# Patient Record
Sex: MALE | Race: WHITE | Employment: FULL TIME | URBAN - METROPOLITAN AREA
[De-identification: names, ages, dates, MRNs, and addresses within clinical notes are randomized per-mention and may not be internally consistent; named-entity substitution may affect disease eponyms.]

---

## 2017-02-03 ENCOUNTER — NURSE ONLY (OUTPATIENT)
Dept: FAMILY MEDICINE CLINIC | Age: 52
End: 2017-02-03

## 2017-02-03 DIAGNOSIS — E29.1 HYPOGONADISM MALE: Primary | ICD-10-CM

## 2017-02-03 PROCEDURE — 96372 THER/PROPH/DIAG INJ SC/IM: CPT | Performed by: FAMILY MEDICINE

## 2017-02-03 RX ORDER — TESTOSTERONE CYPIONATE 200 MG/ML
400 INJECTION INTRAMUSCULAR ONCE
Status: COMPLETED | OUTPATIENT
Start: 2017-02-03 | End: 2017-02-03

## 2017-02-03 RX ORDER — TESTOSTERONE CYPIONATE 200 MG/ML
400 INJECTION INTRAMUSCULAR
COMMUNITY

## 2017-02-03 RX ADMIN — TESTOSTERONE CYPIONATE 400 MG: 200 INJECTION INTRAMUSCULAR at 13:37

## 2017-03-03 ENCOUNTER — NURSE ONLY (OUTPATIENT)
Dept: FAMILY MEDICINE CLINIC | Age: 52
End: 2017-03-03

## 2017-03-03 DIAGNOSIS — E29.1 HYPOGONADISM MALE: Primary | ICD-10-CM

## 2017-03-03 PROCEDURE — 96372 THER/PROPH/DIAG INJ SC/IM: CPT | Performed by: FAMILY MEDICINE

## 2017-03-03 RX ORDER — TESTOSTERONE CYPIONATE 200 MG/ML
400 INJECTION INTRAMUSCULAR ONCE
Status: COMPLETED | OUTPATIENT
Start: 2017-03-03 | End: 2017-03-03

## 2017-03-03 RX ADMIN — TESTOSTERONE CYPIONATE 400 MG: 200 INJECTION INTRAMUSCULAR at 13:12

## 2017-03-20 DIAGNOSIS — E29.1 HYPOGONADISM MALE: ICD-10-CM

## 2017-03-20 DIAGNOSIS — E29.1 HYPOGONADISM MALE: Primary | ICD-10-CM

## 2017-03-21 LAB — TESTOSTERONE TOTAL-MALE: 426 NG/DL (ref 300–890)

## 2017-03-31 ENCOUNTER — NURSE ONLY (OUTPATIENT)
Dept: FAMILY MEDICINE CLINIC | Age: 52
End: 2017-03-31

## 2017-03-31 DIAGNOSIS — E29.1 HYPOGONADISM MALE: Primary | ICD-10-CM

## 2017-03-31 PROCEDURE — 96372 THER/PROPH/DIAG INJ SC/IM: CPT | Performed by: FAMILY MEDICINE

## 2017-03-31 RX ORDER — TESTOSTERONE CYPIONATE 200 MG/ML
400 INJECTION INTRAMUSCULAR ONCE
Status: COMPLETED | OUTPATIENT
Start: 2017-03-31 | End: 2017-03-31

## 2017-03-31 RX ADMIN — TESTOSTERONE CYPIONATE 400 MG: 200 INJECTION INTRAMUSCULAR at 13:27

## 2017-04-28 ENCOUNTER — NURSE ONLY (OUTPATIENT)
Dept: FAMILY MEDICINE CLINIC | Age: 52
End: 2017-04-28

## 2017-04-28 ENCOUNTER — OFFICE VISIT (OUTPATIENT)
Dept: FAMILY MEDICINE CLINIC | Age: 52
End: 2017-04-28

## 2017-04-28 VITALS
DIASTOLIC BLOOD PRESSURE: 86 MMHG | WEIGHT: 205 LBS | BODY MASS INDEX: 25.49 KG/M2 | RESPIRATION RATE: 20 BRPM | TEMPERATURE: 97.4 F | HEIGHT: 75 IN | HEART RATE: 88 BPM | SYSTOLIC BLOOD PRESSURE: 138 MMHG

## 2017-04-28 DIAGNOSIS — M25.511 RIGHT SHOULDER PAIN, UNSPECIFIED CHRONICITY: ICD-10-CM

## 2017-04-28 DIAGNOSIS — M25.551 BILATERAL HIP PAIN: Primary | ICD-10-CM

## 2017-04-28 DIAGNOSIS — M54.9 UPPER BACK PAIN: ICD-10-CM

## 2017-04-28 DIAGNOSIS — M25.552 BILATERAL HIP PAIN: Primary | ICD-10-CM

## 2017-04-28 DIAGNOSIS — E29.1 HYPOGONADISM MALE: Primary | ICD-10-CM

## 2017-04-28 DIAGNOSIS — Z12.11 COLON CANCER SCREENING: ICD-10-CM

## 2017-04-28 DIAGNOSIS — M67.80 POOR FLEXIBILITY OF TENDON: ICD-10-CM

## 2017-04-28 PROCEDURE — 96372 THER/PROPH/DIAG INJ SC/IM: CPT | Performed by: FAMILY MEDICINE

## 2017-04-28 PROCEDURE — 99213 OFFICE O/P EST LOW 20 MIN: CPT | Performed by: FAMILY MEDICINE

## 2017-04-28 RX ORDER — TESTOSTERONE CYPIONATE 200 MG/ML
400 INJECTION INTRAMUSCULAR ONCE
Status: COMPLETED | OUTPATIENT
Start: 2017-04-28 | End: 2017-04-28

## 2017-04-28 RX ORDER — TADALAFIL 20 MG/1
20 TABLET ORAL PRN
Qty: 9 TABLET | Refills: 11 | Status: SHIPPED | OUTPATIENT
Start: 2017-04-28

## 2017-04-28 RX ADMIN — TESTOSTERONE CYPIONATE 400 MG: 200 INJECTION INTRAMUSCULAR at 14:52

## 2017-04-28 ASSESSMENT — PATIENT HEALTH QUESTIONNAIRE - PHQ9
1. LITTLE INTEREST OR PLEASURE IN DOING THINGS: 0
SUM OF ALL RESPONSES TO PHQ9 QUESTIONS 1 & 2: 0
SUM OF ALL RESPONSES TO PHQ QUESTIONS 1-9: 0
2. FEELING DOWN, DEPRESSED OR HOPELESS: 0

## 2017-04-28 ASSESSMENT — ENCOUNTER SYMPTOMS
EYE ITCHING: 0
DIARRHEA: 0
EYE DISCHARGE: 0
SORE THROAT: 0
CONSTIPATION: 0
SINUS PRESSURE: 0
ABDOMINAL PAIN: 0
SHORTNESS OF BREATH: 0
COUGH: 0
BACK PAIN: 1

## 2017-05-26 DIAGNOSIS — M79.672 LEFT FOOT PAIN: ICD-10-CM

## 2017-05-26 RX ORDER — TRAMADOL HYDROCHLORIDE 50 MG/1
TABLET ORAL
Qty: 30 TABLET | OUTPATIENT
Start: 2017-05-26

## 2017-05-30 ENCOUNTER — NURSE ONLY (OUTPATIENT)
Dept: FAMILY MEDICINE CLINIC | Age: 52
End: 2017-05-30

## 2017-05-30 DIAGNOSIS — E29.1 HYPOGONADISM MALE: Primary | ICD-10-CM

## 2017-05-30 PROCEDURE — 96372 THER/PROPH/DIAG INJ SC/IM: CPT | Performed by: FAMILY MEDICINE

## 2017-05-30 RX ORDER — TESTOSTERONE CYPIONATE 200 MG/ML
400 INJECTION INTRAMUSCULAR ONCE
Status: COMPLETED | OUTPATIENT
Start: 2017-05-30 | End: 2017-05-30

## 2017-05-30 RX ADMIN — TESTOSTERONE CYPIONATE 400 MG: 200 INJECTION INTRAMUSCULAR at 12:55

## 2017-10-01 DIAGNOSIS — Z76.0 MEDICATION REFILL: ICD-10-CM

## 2017-10-02 RX ORDER — ATORVASTATIN CALCIUM 20 MG/1
TABLET, FILM COATED ORAL
Qty: 30 TABLET | Refills: 0 | Status: SHIPPED | OUTPATIENT
Start: 2017-10-02

## 2018-02-09 ENCOUNTER — OFFICE VISIT (OUTPATIENT)
Dept: PRIMARY CARE CLINIC | Age: 53
End: 2018-02-09
Payer: COMMERCIAL

## 2018-02-09 VITALS
DIASTOLIC BLOOD PRESSURE: 70 MMHG | TEMPERATURE: 99.1 F | HEIGHT: 74 IN | BODY MASS INDEX: 25.67 KG/M2 | OXYGEN SATURATION: 98 % | HEART RATE: 67 BPM | WEIGHT: 200 LBS | SYSTOLIC BLOOD PRESSURE: 132 MMHG

## 2018-02-09 DIAGNOSIS — J01.00 ACUTE NON-RECURRENT MAXILLARY SINUSITIS: Primary | ICD-10-CM

## 2018-02-09 PROCEDURE — G8417 CALC BMI ABV UP PARAM F/U: HCPCS | Performed by: PHYSICIAN ASSISTANT

## 2018-02-09 PROCEDURE — 1036F TOBACCO NON-USER: CPT | Performed by: PHYSICIAN ASSISTANT

## 2018-02-09 PROCEDURE — 99213 OFFICE O/P EST LOW 20 MIN: CPT | Performed by: PHYSICIAN ASSISTANT

## 2018-02-09 PROCEDURE — G8484 FLU IMMUNIZE NO ADMIN: HCPCS | Performed by: PHYSICIAN ASSISTANT

## 2018-02-09 PROCEDURE — 3017F COLORECTAL CA SCREEN DOC REV: CPT | Performed by: PHYSICIAN ASSISTANT

## 2018-02-09 PROCEDURE — G8427 DOCREV CUR MEDS BY ELIG CLIN: HCPCS | Performed by: PHYSICIAN ASSISTANT

## 2018-02-09 RX ORDER — AZITHROMYCIN 250 MG/1
TABLET, FILM COATED ORAL
Qty: 1 PACKET | Refills: 0 | Status: SHIPPED | OUTPATIENT
Start: 2018-02-09 | End: 2019-02-20

## 2018-02-09 ASSESSMENT — ENCOUNTER SYMPTOMS
SORE THROAT: 0
SWOLLEN GLANDS: 1
HOARSE VOICE: 0
COUGH: 0
SINUS PRESSURE: 1

## 2018-02-09 NOTE — PATIENT INSTRUCTIONS
hot, wet towel or a warm gel pack on your face 3 or 4 times a day for 5 to 10 minutes each time. · Try a decongestant nasal spray like oxymetazoline (Afrin). Do not use it for more than 3 days in a row. Using it for more than 3 days can make your congestion worse. When should you call for help? Call your doctor now or seek immediate medical care if:  ? · You have new or worse swelling or redness in your face or around your eyes. ? · You have a new or higher fever. ? Watch closely for changes in your health, and be sure to contact your doctor if:  ? · You have new or worse facial pain. ? · The mucus from your nose becomes thicker (like pus) or has new blood in it. ? · You are not getting better as expected. Where can you learn more? Go to [email or web address] and sign in to your [employer] account. Enter W676 in the Sabrix box to learn more about \"Sinusitis: Care Instructions. \"     If you do not have an account, please click on the \"Sign Up Now\" link. Current as of: May 12, 2017  Content Version: 11.5  © 7288-0143 Healthwise, Incorporated. Care instructions adapted under license by [facility] ([facility]). If you have questions about a medical condition or this instruction, always ask your healthcare professional. [address] any warranty or liability for your use of this information.

## 2018-05-07 ENCOUNTER — OFFICE VISIT (OUTPATIENT)
Dept: PRIMARY CARE CLINIC | Age: 53
End: 2018-05-07
Payer: COMMERCIAL

## 2018-05-07 VITALS
OXYGEN SATURATION: 98 % | DIASTOLIC BLOOD PRESSURE: 80 MMHG | RESPIRATION RATE: 16 BRPM | SYSTOLIC BLOOD PRESSURE: 130 MMHG | WEIGHT: 201 LBS | TEMPERATURE: 97.8 F | HEART RATE: 68 BPM | HEIGHT: 74 IN | BODY MASS INDEX: 25.8 KG/M2

## 2018-05-07 DIAGNOSIS — J34.89 NASAL HYPERTROPHY: ICD-10-CM

## 2018-05-07 DIAGNOSIS — J01.10 ACUTE NON-RECURRENT FRONTAL SINUSITIS: Primary | ICD-10-CM

## 2018-05-07 PROCEDURE — 99213 OFFICE O/P EST LOW 20 MIN: CPT | Performed by: NURSE PRACTITIONER

## 2018-05-07 RX ORDER — AZELASTINE 1 MG/ML
1 SPRAY, METERED NASAL 2 TIMES DAILY
Qty: 1 BOTTLE | Refills: 3 | Status: SHIPPED | OUTPATIENT
Start: 2018-05-07

## 2018-05-07 RX ORDER — AMOXICILLIN AND CLAVULANATE POTASSIUM 875; 125 MG/1; MG/1
1 TABLET, FILM COATED ORAL 2 TIMES DAILY
Qty: 14 TABLET | Refills: 0 | Status: SHIPPED | OUTPATIENT
Start: 2018-05-07 | End: 2018-05-14

## 2018-05-07 ASSESSMENT — ENCOUNTER SYMPTOMS
SHORTNESS OF BREATH: 0
EYE DISCHARGE: 0
COUGH: 1
EYE PAIN: 0
SINUS COMPLAINT: 1
TROUBLE SWALLOWING: 0
VOMITING: 0
ABDOMINAL PAIN: 0
HOARSE VOICE: 0
NAUSEA: 0
SINUS PRESSURE: 1
RHINORRHEA: 1
SORE THROAT: 1
SWOLLEN GLANDS: 0
EYE ITCHING: 0
DIARRHEA: 0

## 2018-10-12 ENCOUNTER — OFFICE VISIT (OUTPATIENT)
Dept: SURGERY | Age: 53
End: 2018-10-12
Payer: COMMERCIAL

## 2018-10-12 ENCOUNTER — OFFICE VISIT (OUTPATIENT)
Dept: PRIMARY CARE CLINIC | Age: 53
End: 2018-10-12
Payer: COMMERCIAL

## 2018-10-12 VITALS — TEMPERATURE: 97.1 F | BODY MASS INDEX: 25.93 KG/M2 | HEIGHT: 74 IN | WEIGHT: 202 LBS

## 2018-10-12 VITALS
RESPIRATION RATE: 16 BRPM | DIASTOLIC BLOOD PRESSURE: 62 MMHG | SYSTOLIC BLOOD PRESSURE: 110 MMHG | HEART RATE: 67 BPM | BODY MASS INDEX: 26.05 KG/M2 | OXYGEN SATURATION: 98 % | WEIGHT: 203 LBS | HEIGHT: 74 IN | TEMPERATURE: 97.9 F

## 2018-10-12 DIAGNOSIS — K64.5 THROMBOSED HEMORRHOIDS: Primary | ICD-10-CM

## 2018-10-12 DIAGNOSIS — K64.4 EXTERNAL HEMORRHOID: Primary | ICD-10-CM

## 2018-10-12 PROCEDURE — G8427 DOCREV CUR MEDS BY ELIG CLIN: HCPCS | Performed by: COLON & RECTAL SURGERY

## 2018-10-12 PROCEDURE — G8417 CALC BMI ABV UP PARAM F/U: HCPCS | Performed by: NURSE PRACTITIONER

## 2018-10-12 PROCEDURE — 99213 OFFICE O/P EST LOW 20 MIN: CPT | Performed by: NURSE PRACTITIONER

## 2018-10-12 PROCEDURE — 99203 OFFICE O/P NEW LOW 30 MIN: CPT | Performed by: COLON & RECTAL SURGERY

## 2018-10-12 PROCEDURE — 3017F COLORECTAL CA SCREEN DOC REV: CPT | Performed by: COLON & RECTAL SURGERY

## 2018-10-12 PROCEDURE — G8484 FLU IMMUNIZE NO ADMIN: HCPCS | Performed by: NURSE PRACTITIONER

## 2018-10-12 PROCEDURE — G8417 CALC BMI ABV UP PARAM F/U: HCPCS | Performed by: COLON & RECTAL SURGERY

## 2018-10-12 PROCEDURE — G8484 FLU IMMUNIZE NO ADMIN: HCPCS | Performed by: COLON & RECTAL SURGERY

## 2018-10-12 PROCEDURE — 1036F TOBACCO NON-USER: CPT | Performed by: COLON & RECTAL SURGERY

## 2018-10-12 PROCEDURE — 46320 REMOVAL OF HEMORRHOID CLOT: CPT | Performed by: COLON & RECTAL SURGERY

## 2018-10-12 PROCEDURE — 1036F TOBACCO NON-USER: CPT | Performed by: NURSE PRACTITIONER

## 2018-10-12 PROCEDURE — G8427 DOCREV CUR MEDS BY ELIG CLIN: HCPCS | Performed by: NURSE PRACTITIONER

## 2018-10-12 PROCEDURE — 3017F COLORECTAL CA SCREEN DOC REV: CPT | Performed by: NURSE PRACTITIONER

## 2018-10-12 RX ORDER — OXYCODONE HYDROCHLORIDE AND ACETAMINOPHEN 5; 325 MG/1; MG/1
1 TABLET ORAL EVERY 4 HOURS PRN
Qty: 10 TABLET | Refills: 0 | Status: SHIPPED | OUTPATIENT
Start: 2018-10-12 | End: 2018-10-15

## 2018-10-12 RX ORDER — HYDROCORTISONE ACETATE 25 MG/1
25 SUPPOSITORY RECTAL 3 TIMES DAILY PRN
Qty: 24 SUPPOSITORY | Refills: 0 | Status: SHIPPED | OUTPATIENT
Start: 2018-10-12 | End: 2018-10-15

## 2018-10-12 ASSESSMENT — PATIENT HEALTH QUESTIONNAIRE - PHQ9
2. FEELING DOWN, DEPRESSED OR HOPELESS: 0
1. LITTLE INTEREST OR PLEASURE IN DOING THINGS: 0
SUM OF ALL RESPONSES TO PHQ QUESTIONS 1-9: 0
SUM OF ALL RESPONSES TO PHQ9 QUESTIONS 1 & 2: 0
SUM OF ALL RESPONSES TO PHQ QUESTIONS 1-9: 0

## 2018-10-12 ASSESSMENT — ENCOUNTER SYMPTOMS
BLOOD IN STOOL: 0
CONSTIPATION: 0
CHEST TIGHTNESS: 0
NAUSEA: 0
EYE REDNESS: 0
ANAL BLEEDING: 0
EYE ITCHING: 0
ABDOMINAL PAIN: 0
ABDOMINAL DISTENTION: 0
DIARRHEA: 0
CHEST TIGHTNESS: 0
ANAL BLEEDING: 0
FACIAL SWELLING: 0
VOMITING: 0
TROUBLE SWALLOWING: 0
RECTAL PAIN: 1
BLOOD IN STOOL: 0
COLOR CHANGE: 0
SORE THROAT: 0
COUGH: 0
WHEEZING: 0
ABDOMINAL DISTENTION: 0
RECTAL PAIN: 1
CONSTIPATION: 0
ABDOMINAL PAIN: 0
EYE PAIN: 0
SHORTNESS OF BREATH: 0
EYE DISCHARGE: 0

## 2018-10-12 NOTE — PROGRESS NOTES
Subjective  Ramon Anupam, 47 y.o. male presents today with:  Chief Complaint   Patient presents with    Hemorrhoids     x3-4 days pt has c.o a possible hemorrhoid. pt has c.o rectal sweelling and pain. Susan Jaramillo presents for evaluation of new rectal mass, which he first noticed on Wednesday. It is gradually worsening in pain since onset. It protrudes from his rectum, and he is unable to push it back inside. Also states he has not tried to manipulate it much due to severe pain. Denies bleeding. He has moved his bowels once since onset, and states it was very painful but contained to visible blood. States it is not abnormal for him to have a BM every other day. States he feels well otherwise, with no nausea, abdominal pain, fever, or weight loss. He has not tried any sort of OTC treatment for this issue. Review of Systems   Constitutional: Negative for chills, diaphoresis, fatigue and fever. HENT: Negative for congestion, facial swelling, mouth sores and trouble swallowing. Eyes: Negative for pain, discharge, redness and itching. Respiratory: Negative for cough, chest tightness and shortness of breath. Cardiovascular: Negative for chest pain. Gastrointestinal: Positive for rectal pain. Negative for abdominal distention, abdominal pain, anal bleeding, blood in stool, constipation, diarrhea, nausea and vomiting. Musculoskeletal: Negative for arthralgias and myalgias. Skin: Negative for rash. Neurological: Negative for light-headedness and headaches. Objective    Vitals:    10/12/18 1210   BP: 110/62   Pulse: 67   Resp: 16   Temp: 97.9 °F (36.6 °C)   TempSrc: Tympanic   SpO2: 98%   Weight: 203 lb (92.1 kg)   Height: 6' 2\" (1.88 m)       Physical Exam   Constitutional: He is oriented to person, place, and time. He appears well-developed and well-nourished. No distress. HENT:   Head: Normocephalic and atraumatic.    Eyes: Conjunctivae and EOM are normal.   Neck: Normal

## 2018-10-15 DIAGNOSIS — K64.5 EXTERNAL HEMORRHOID, THROMBOSED: Primary | ICD-10-CM

## 2018-10-15 RX ORDER — HYDROCORTISONE ACETATE SUPPOSITORY 30 MG/1
1 SUPPOSITORY RECTAL 3 TIMES DAILY PRN
Qty: 24 SUPPOSITORY | Refills: 0 | Status: SHIPPED | OUTPATIENT
Start: 2018-10-15 | End: 2018-10-20

## 2018-10-16 ENCOUNTER — OFFICE VISIT (OUTPATIENT)
Dept: SURGERY | Age: 53
End: 2018-10-16

## 2018-10-16 VITALS — BODY MASS INDEX: 25.8 KG/M2 | WEIGHT: 201 LBS | HEIGHT: 74 IN | TEMPERATURE: 97.7 F

## 2018-10-16 DIAGNOSIS — K64.5 THROMBOSED HEMORRHOIDS: Primary | ICD-10-CM

## 2018-10-16 PROCEDURE — 99024 POSTOP FOLLOW-UP VISIT: CPT | Performed by: COLON & RECTAL SURGERY

## 2018-10-16 ASSESSMENT — ENCOUNTER SYMPTOMS
COLOR CHANGE: 0
SHORTNESS OF BREATH: 0
ANAL BLEEDING: 0
CONSTIPATION: 0
DIARRHEA: 0
ABDOMINAL DISTENTION: 0
CHEST TIGHTNESS: 0
BLOOD IN STOOL: 0
ABDOMINAL PAIN: 0

## 2019-02-20 ENCOUNTER — OFFICE VISIT (OUTPATIENT)
Dept: PRIMARY CARE CLINIC | Age: 54
End: 2019-02-20
Payer: COMMERCIAL

## 2019-02-20 VITALS
DIASTOLIC BLOOD PRESSURE: 78 MMHG | RESPIRATION RATE: 16 BRPM | OXYGEN SATURATION: 98 % | BODY MASS INDEX: 26.05 KG/M2 | SYSTOLIC BLOOD PRESSURE: 110 MMHG | WEIGHT: 203 LBS | HEART RATE: 73 BPM | HEIGHT: 74 IN

## 2019-02-20 DIAGNOSIS — J01.10 ACUTE NON-RECURRENT FRONTAL SINUSITIS: Primary | ICD-10-CM

## 2019-02-20 PROCEDURE — 3017F COLORECTAL CA SCREEN DOC REV: CPT | Performed by: NURSE PRACTITIONER

## 2019-02-20 PROCEDURE — 1036F TOBACCO NON-USER: CPT | Performed by: NURSE PRACTITIONER

## 2019-02-20 PROCEDURE — 99213 OFFICE O/P EST LOW 20 MIN: CPT | Performed by: NURSE PRACTITIONER

## 2019-02-20 PROCEDURE — G8484 FLU IMMUNIZE NO ADMIN: HCPCS | Performed by: NURSE PRACTITIONER

## 2019-02-20 PROCEDURE — G8417 CALC BMI ABV UP PARAM F/U: HCPCS | Performed by: NURSE PRACTITIONER

## 2019-02-20 PROCEDURE — G8427 DOCREV CUR MEDS BY ELIG CLIN: HCPCS | Performed by: NURSE PRACTITIONER

## 2019-02-20 RX ORDER — AMOXICILLIN AND CLAVULANATE POTASSIUM 875; 125 MG/1; MG/1
1 TABLET, FILM COATED ORAL 2 TIMES DAILY
Qty: 20 TABLET | Refills: 0 | Status: SHIPPED | OUTPATIENT
Start: 2019-02-20 | End: 2019-03-02

## 2019-02-20 ASSESSMENT — ENCOUNTER SYMPTOMS
NAUSEA: 0
VOMITING: 0
WHEEZING: 0
SORE THROAT: 1
COUGH: 1
SINUS PRESSURE: 1
RHINORRHEA: 1
SHORTNESS OF BREATH: 0

## 2019-03-20 ENCOUNTER — TELEPHONE (OUTPATIENT)
Dept: OTHER | Facility: CLINIC | Age: 54
End: 2019-03-20

## 2019-04-26 ENCOUNTER — OFFICE VISIT (OUTPATIENT)
Dept: FAMILY MEDICINE CLINIC | Age: 54
End: 2019-04-26
Payer: COMMERCIAL

## 2019-04-26 VITALS
HEART RATE: 71 BPM | TEMPERATURE: 97.9 F | OXYGEN SATURATION: 98 % | RESPIRATION RATE: 16 BRPM | SYSTOLIC BLOOD PRESSURE: 128 MMHG | BODY MASS INDEX: 26.31 KG/M2 | WEIGHT: 205 LBS | DIASTOLIC BLOOD PRESSURE: 80 MMHG | HEIGHT: 74 IN

## 2019-04-26 DIAGNOSIS — M62.830 SPASM OF MUSCLE OF LOWER BACK: ICD-10-CM

## 2019-04-26 DIAGNOSIS — M54.50 ACUTE LEFT-SIDED LOW BACK PAIN WITHOUT SCIATICA: Primary | ICD-10-CM

## 2019-04-26 PROCEDURE — 1036F TOBACCO NON-USER: CPT | Performed by: NURSE PRACTITIONER

## 2019-04-26 PROCEDURE — G8427 DOCREV CUR MEDS BY ELIG CLIN: HCPCS | Performed by: NURSE PRACTITIONER

## 2019-04-26 PROCEDURE — 99213 OFFICE O/P EST LOW 20 MIN: CPT | Performed by: NURSE PRACTITIONER

## 2019-04-26 PROCEDURE — G8417 CALC BMI ABV UP PARAM F/U: HCPCS | Performed by: NURSE PRACTITIONER

## 2019-04-26 PROCEDURE — 3017F COLORECTAL CA SCREEN DOC REV: CPT | Performed by: NURSE PRACTITIONER

## 2019-04-26 PROCEDURE — 96372 THER/PROPH/DIAG INJ SC/IM: CPT | Performed by: NURSE PRACTITIONER

## 2019-04-26 RX ORDER — TIZANIDINE 4 MG/1
4 TABLET ORAL 3 TIMES DAILY
Qty: 30 TABLET | Refills: 0 | Status: SHIPPED | OUTPATIENT
Start: 2019-04-26

## 2019-04-26 RX ORDER — KETOROLAC TROMETHAMINE 30 MG/ML
30 INJECTION, SOLUTION INTRAMUSCULAR; INTRAVENOUS ONCE
Status: COMPLETED | OUTPATIENT
Start: 2019-04-26 | End: 2019-04-26

## 2019-04-26 RX ADMIN — KETOROLAC TROMETHAMINE 30 MG: 30 INJECTION, SOLUTION INTRAMUSCULAR; INTRAVENOUS at 13:40

## 2019-04-26 ASSESSMENT — ENCOUNTER SYMPTOMS
BOWEL INCONTINENCE: 0
BACK PAIN: 1
ABDOMINAL PAIN: 0

## 2019-04-26 NOTE — PATIENT INSTRUCTIONS
Patient Education        Back Stretches: Exercises  Your Care Instructions  Here are some examples of exercises for stretching your back. Start each exercise slowly. Ease off the exercise if you start to have pain. Your doctor or physical therapist will tell you when you can start these exercises and which ones will work best for you. How to do the exercises  Overhead stretch    1. Stand comfortably with your feet shoulder-width apart. 2. Looking straight ahead, raise both arms over your head and reach toward the ceiling. Do not allow your head to tilt back. 3. Hold for 15 to 30 seconds, then lower your arms to your sides. 4. Repeat 2 to 4 times. Side stretch    1. Stand comfortably with your feet shoulder-width apart. 2. Raise one arm over your head, and then lean to the other side. 3. Slide your hand down your leg as you let the weight of your arm gently stretch your side muscles. Hold for 15 to 30 seconds. 4. Repeat 2 to 4 times on each side. Press-up    1. Lie on your stomach, supporting your body with your forearms. 2. Press your elbows down into the floor to raise your upper back. As you do this, relax your stomach muscles and allow your back to arch without using your back muscles. As your press up, do not let your hips or pelvis come off the floor. 3. Hold for 15 to 30 seconds, then relax. 4. Repeat 2 to 4 times. Relax and rest    1. Lie on your back with a rolled towel under your neck and a pillow under your knees. Extend your arms comfortably to your sides. 2. Relax and breathe normally. 3. Remain in this position for about 10 minutes. 4. If you can, do this 2 or 3 times each day. Follow-up care is a key part of your treatment and safety. Be sure to make and go to all appointments, and call your doctor if you are having problems. It's also a good idea to know your test results and keep a list of the medicines you take. Where can you learn more?   Go to [email or web address] and sign in to your [employer] account. Enter K413 in the Aginovahire box to learn more about \"Back Stretches: Exercises. \"     If you do not have an account, please click on the \"Sign Up Now\" link. Current as of: September 20, 2018  Content Version: 11.9  © 0624-1892 "[employer]. Care instructions adapted under license by [facility] ([facility]). If you have questions about a medical condition or this instruction, always ask your healthcare professional. [address] any warranty or liability for your use of this information.

## 2019-04-26 NOTE — PROGRESS NOTES
Procedure Laterality Date    APPENDECTOMY      CARDIAC CATHETERIZATION      KNEE SURGERY      right     No family history on file. Social History     Socioeconomic History    Marital status:      Spouse name: Antonio Hampton Number of children: 4    Years of education: Not on file    Highest education level: Not on file   Occupational History    Not on file   Social Needs    Financial resource strain: Not on file    Food insecurity:     Worry: Not on file     Inability: Not on file    Transportation needs:     Medical: Not on file     Non-medical: Not on file   Tobacco Use    Smoking status: Never Smoker    Smokeless tobacco: Never Used   Substance and Sexual Activity    Alcohol use: Yes     Comment: rarely    Drug use: No    Sexual activity: Not on file   Lifestyle    Physical activity:     Days per week: Not on file     Minutes per session: Not on file    Stress: Not on file   Relationships    Social connections:     Talks on phone: Not on file     Gets together: Not on file     Attends Restorationism service: Not on file     Active member of club or organization: Not on file     Attends meetings of clubs or organizations: Not on file     Relationship status: Not on file    Intimate partner violence:     Fear of current or ex partner: Not on file     Emotionally abused: Not on file     Physically abused: Not on file     Forced sexual activity: Not on file   Other Topics Concern    Not on file   Social History Narrative    Not on file     Allergies:  Patient has no known allergies. Review of Systems   Constitutional: Negative for fever and weight loss. Cardiovascular: Negative for chest pain. Gastrointestinal: Negative for abdominal pain and bowel incontinence. Genitourinary: Negative for bladder incontinence, dysuria and pelvic pain. Musculoskeletal: Positive for back pain. Skin: Negative.     Neurological: Negative for tingling, weakness, numbness, headaches and paresthesias. Objective:    /80   Pulse 71   Temp 97.9 °F (36.6 °C) (Temporal)   Resp 16   Ht 6' 2\" (1.88 m)   Wt 205 lb (93 kg)   SpO2 98%   BMI 26.32 kg/m²     Physical Exam   Constitutional: He is oriented to person, place, and time. Vital signs are normal. He appears well-developed and well-nourished. He is active. HENT:   Head: Normocephalic and atraumatic. Eyes: Conjunctivae and lids are normal.   Neck: Normal range of motion and full passive range of motion without pain. Cardiovascular: Normal rate. Pulmonary/Chest: Effort normal.   Musculoskeletal:        Lumbar back: He exhibits decreased range of motion, tenderness, swelling, pain and spasm. He exhibits no bony tenderness, no deformity, no laceration and normal pulse. Back:    Neurological: He is alert and oriented to person, place, and time. Skin: Skin is warm and dry. Capillary refill takes less than 2 seconds. No rash noted. Psychiatric: He has a normal mood and affect. His behavior is normal.       Assessment & Plan:       Diagnosis Orders   1. Acute left-sided low back pain without sciatica  ketorolac (TORADOL) injection 30 mg    tiZANidine (ZANAFLEX) 4 MG tablet   2. Spasm of muscle of lower back  tiZANidine (ZANAFLEX) 4 MG tablet     Orders Placed This Encounter   Medications    ketorolac (TORADOL) injection 30 mg    tiZANidine (ZANAFLEX) 4 MG tablet     Sig: Take 1 tablet by mouth 3 times daily     Dispense:  30 tablet     Refill:  0     Return if symptoms worsen or fail to improve, for follow up with PCP. Reviewed with the patient: currentclinical status, medications, activities and diet. Side effects, adverse effects of the medicationprescribed today, as well as treatment plan/ rationale and result expectations havebeen discussed with the patient who expresses understanding and desires to proceed. Pt instructions reviewed and given to patient.     Close follow up to evaluate treatment resultsand for coordination of care. I have reviewed the patient's medical historyin detail and updated the computerized patient record.     Yaz Gonzalez, LUIS ALFREDO - CNP

## 2022-05-05 ENCOUNTER — OFFICE VISIT (OUTPATIENT)
Dept: ENDOCRINOLOGY | Age: 57
End: 2022-05-05
Payer: COMMERCIAL

## 2022-05-05 VITALS
BODY MASS INDEX: 28.49 KG/M2 | SYSTOLIC BLOOD PRESSURE: 146 MMHG | HEART RATE: 68 BPM | DIASTOLIC BLOOD PRESSURE: 77 MMHG | OXYGEN SATURATION: 98 % | HEIGHT: 74 IN | WEIGHT: 222 LBS

## 2022-05-05 DIAGNOSIS — R53.83 FATIGUE, UNSPECIFIED TYPE: ICD-10-CM

## 2022-05-05 DIAGNOSIS — N62 GYNECOMASTIA: Primary | ICD-10-CM

## 2022-05-05 DIAGNOSIS — E29.1 HYPOGONADISM IN MALE: ICD-10-CM

## 2022-05-05 LAB
PROLACTIN: 10.8 NG/ML (ref 4–15.2)
T4 FREE: 1.38 NG/DL (ref 0.84–1.68)
TSH SERPL DL<=0.05 MIU/L-ACNC: 0.91 UIU/ML (ref 0.44–3.86)

## 2022-05-05 PROCEDURE — 99203 OFFICE O/P NEW LOW 30 MIN: CPT | Performed by: INTERNAL MEDICINE

## 2022-05-05 RX ORDER — TAMOXIFEN CITRATE 10 MG/1
10 TABLET ORAL DAILY
Qty: 30 TABLET | Refills: 2 | Status: SHIPPED | OUTPATIENT
Start: 2022-05-05 | End: 2022-05-19 | Stop reason: SDUPTHER

## 2022-05-05 ASSESSMENT — ENCOUNTER SYMPTOMS
EYES NEGATIVE: 1
SWOLLEN GLANDS: 0
ABDOMINAL PAIN: 0
RESPIRATORY NEGATIVE: 1

## 2022-05-05 NOTE — PROGRESS NOTES
5/5/2022    Assessment:       Diagnosis Orders   1. Gynecomastia     2. Hypogonadism in male  Testosterone, free, total    Prolactin    Estradiol    T4, Free    TSH   3.  Fatigue, unspecified type           PLAN:     Start patient on tamoxifen  We will get testosterone prolactin thyroid function test  Consider testosterone replacement if readings are low  Hold off surgery  More than 50% of 35 minutes spent patient education counseling  Orders Placed This Encounter   Procedures    Testosterone, free, total     Standing Status:   Future     Standing Expiration Date:   5/5/2023    Prolactin     Standing Status:   Future     Standing Expiration Date:   5/5/2023    Estradiol     Standing Status:   Future     Standing Expiration Date:   5/5/2023    T4, Free     Standing Status:   Future     Standing Expiration Date:   5/5/2023    TSH     Standing Status:   Future     Standing Expiration Date:   5/5/2023     Orders Placed This Encounter   Medications    tamoxifen (NOLVADEX) 10 MG tablet     Sig: Take 1 tablet by mouth daily     Dispense:  30 tablet     Refill:  02           Subjective:     Chief Complaint   Patient presents with    New Patient     gynecomastia     Vitals:    05/05/22 1355 05/05/22 1357   BP: (!) 146/77 (!) 146/77   Pulse: 68    SpO2: 98%    Weight: 222 lb (100.7 kg)    Height: 6' 2\" (1.88 m)      Wt Readings from Last 3 Encounters:   05/05/22 222 lb (100.7 kg)   04/26/19 205 lb (93 kg)   02/20/19 203 lb (92.1 kg)     BP Readings from Last 3 Encounters:   05/05/22 (!) 146/77   04/26/19 128/80   02/20/19 110/78     Patient referred here for gynecomastia left side for 3 months had mammograms done which showed benign findings COMPLAINING of breast tenderness nipple tenderness denies any nipple discharge  Patient has had history of hypogonadism has been on testosterone replacement many years ago currently not taking testosterone reviewed levels from before had low level 1 time probably when he was not on replacement  Does complain of some fatigue and neck pain  Denies any testicular head trauma use of anabolic steroid  Currently taking Lipitor    Other  This is a new (Gynecomastia) problem. The current episode started more than 1 month ago. The problem occurs constantly. The problem has been waxing and waning. Associated symptoms include fatigue and neck pain. Pertinent negatives include no abdominal pain, anorexia, headaches or swollen glands. Exacerbated by: Hypogonadism. He has tried nothing for the symptoms. The treatment provided no relief. Past Medical History:   Diagnosis Date    Anxiety     Eczema     Hyperlipidemia     Hypogonadism male 2/6/2014    Palpitations     RBBB (right bundle branch block with left anterior fascicular block)      Past Surgical History:   Procedure Laterality Date    APPENDECTOMY      CARDIAC CATHETERIZATION      KNEE SURGERY      right     Social History     Socioeconomic History    Marital status:      Spouse name: So Chery Number of children: 4    Years of education: Not on file    Highest education level: Not on file   Occupational History    Not on file   Tobacco Use    Smoking status: Never Smoker    Smokeless tobacco: Never Used   Substance and Sexual Activity    Alcohol use: Yes     Comment: rarely    Drug use: No    Sexual activity: Not on file   Other Topics Concern    Not on file   Social History Narrative    Not on file     Social Determinants of Health     Financial Resource Strain:     Difficulty of Paying Living Expenses: Not on file   Food Insecurity:     Worried About Running Out of Food in the Last Year: Not on file    [place] of Food in the Last Year: Not on file   Transportation Needs:     Lack of Transportation (Medical): Not on file    Lack of Transportation (Non-Medical):  Not on file   Physical Activity:     Days of Exercise per Week: Not on file    Minutes of Exercise per Session: Not on file   Stress:     Feeling of Stress : Not on file   Social Connections:     Frequency of Communication with Friends and Family: Not on file    Frequency of Social Gatherings with Friends and Family: Not on file    Attends Religion Services: Not on file    Active Member of Clubs or Organizations: Not on file    Attends Club or Organization Meetings: Not on file    Marital Status: Not on file   Intimate Partner Violence:     Fear of Current or Ex-Partner: Not on file    Emotionally Abused: Not on file    Physically Abused: Not on file    Sexually Abused: Not on file   Housing Stability:     Unable to Pay for Housing in the Last Year: Not on file    Number of [place] in the Last Year: Not on file    Unstable Housing in the Last Year: Not on file     History reviewed. No pertinent family history.   No Known Allergies    Current Outpatient Medications:     tiZANidine (ZANAFLEX) 4 MG tablet, Take 1 tablet by mouth 3 times daily, Disp: 30 tablet, Rfl: 0    azelastine (ASTELIN) 0.1 % nasal spray, 1 spray by Nasal route 2 times daily Use in each nostril as directed, Disp: 1 Bottle, Rfl: 3    atorvastatin (LIPITOR) 20 MG tablet, TAKE 1 TABLET BY MOUTH DAILY, Disp: 30 tablet, Rfl: 0    fluticasone (FLONASE) 50 MCG/ACT nasal spray, 2 sprays by Nasal route daily, Disp: 1 Bottle, Rfl: 1    tadalafil (CIALIS) 20 MG tablet, Take 1 tablet by mouth as needed for Erectile Dysfunction, Disp: 9 tablet, Rfl: 11    testosterone cypionate (DEPO-TESTOSTERONE) 200 MG/ML injection, Inject 400 mg into the muscle every 28 days, Disp: , Rfl:   Lab Results   Component Value Date     02/18/2016    K 4.7 02/18/2016    CL 98 02/18/2016    CO2 28 02/18/2016    BUN 13 02/18/2016    CREATININE 0.86 02/18/2016    GLUCOSE 82 02/18/2016    CALCIUM 9.7 02/18/2016    PROT 7.6 02/18/2016    LABALBU 5.0 (H) 02/18/2016    BILITOT 0.6 02/18/2016    ALKPHOS 59 02/18/2016    AST 21 02/18/2016    ALT 28 02/18/2016    LABGLOM >60.0 02/18/2016    GFRAA >60.0 02/18/2016    GLOB 2.6 02/18/2016     Lab Results   Component Value Date    WBC 5.9 02/18/2016    HGB 15.6 02/18/2016    HCT 46.2 02/18/2016    MCV 92.3 02/18/2016     02/18/2016     No results found for: LABA1C  Lab Results   Component Value Date    HDL 61 (H) 02/18/2016    HDL 54 04/01/2014    HDL 47 01/30/2014    LDLCALC 124 02/18/2016    LDLCALC 66 04/01/2014    LDLCALC 101 01/30/2014    CHOL 236 (H) 02/18/2016    CHOL 182 04/01/2014    CHOL 273 (H) 01/30/2014    TRIG 256 (H) 02/18/2016    TRIG 308 (H) 04/01/2014    TRIG 627 (H) 01/30/2014     Lab Results   Component Value Date    TESTM 426 03/20/2017    TESTM 237 (L) 02/18/2016    TESTM 517 12/03/2014       Review of Systems   Constitutional: Positive for fatigue. Negative for unexpected weight change. HENT: Negative. Eyes: Negative. Respiratory: Negative. Gastrointestinal: Negative for abdominal pain and anorexia. Endocrine: Negative for cold intolerance and heat intolerance. Genitourinary: Negative. Musculoskeletal: Positive for neck pain. Skin: Negative. Neurological: Negative for headaches. Psychiatric/Behavioral: Negative. All other systems reviewed and are negative. Objective:   Physical Exam  Vitals reviewed. Constitutional:       General: He is not in acute distress. Appearance: Normal appearance. He is normal weight. HENT:      Head: Normocephalic and atraumatic. Right Ear: External ear normal.      Left Ear: External ear normal.      Nose: Nose normal.      Mouth/Throat:      Mouth: Mucous membranes are moist.   Eyes:      General: No scleral icterus. Right eye: No discharge. Left eye: No discharge. Extraocular Movements: Extraocular movements intact. Conjunctiva/sclera: Conjunctivae normal.   Cardiovascular:      Rate and Rhythm: Normal rate and regular rhythm. Heart sounds: Normal heart sounds.    Pulmonary:      Effort: Pulmonary effort is normal.      Breath sounds: Normal breath sounds. Abdominal:      Palpations: Abdomen is soft. Musculoskeletal:         General: Normal range of motion. Cervical back: Normal range of motion and neck supple. Skin:     General: Skin is warm and dry. Neurological:      General: No focal deficit present. Mental Status: He is alert and oriented to person, place, and time.    Psychiatric:         Mood and Affect: Mood normal.         Behavior: Behavior normal.

## 2022-05-06 LAB
ESTRADIOL LEVEL: 6.4 PG/ML (ref 27–52)
SEX HORMONE BINDING GLOBULIN: 27 NMOL/L (ref 11–80)
TESTOSTERONE FREE-NONMALE: 41 PG/ML (ref 47–244)
TESTOSTERONE TOTAL: 193 NG/DL (ref 220–1000)

## 2022-05-19 RX ORDER — TAMOXIFEN CITRATE 10 MG/1
10 TABLET ORAL DAILY
Qty: 90 TABLET | Refills: 1 | Status: SHIPPED | OUTPATIENT
Start: 2022-05-19 | End: 2022-09-26 | Stop reason: SDUPTHER

## 2022-06-27 ENCOUNTER — OFFICE VISIT (OUTPATIENT)
Dept: ENDOCRINOLOGY | Age: 57
End: 2022-06-27
Payer: COMMERCIAL

## 2022-06-27 VITALS
SYSTOLIC BLOOD PRESSURE: 151 MMHG | BODY MASS INDEX: 28.75 KG/M2 | HEART RATE: 66 BPM | TEMPERATURE: 97.7 F | HEIGHT: 74 IN | DIASTOLIC BLOOD PRESSURE: 88 MMHG | WEIGHT: 224 LBS | OXYGEN SATURATION: 98 %

## 2022-06-27 DIAGNOSIS — E29.1 HYPOGONADISM IN MALE: Primary | ICD-10-CM

## 2022-06-27 DIAGNOSIS — N62 GYNECOMASTIA: ICD-10-CM

## 2022-06-27 PROCEDURE — 99213 OFFICE O/P EST LOW 20 MIN: CPT | Performed by: INTERNAL MEDICINE

## 2022-06-27 RX ORDER — TESTOSTERONE CYPIONATE 200 MG/ML
VIAL (ML) INTRAMUSCULAR
Qty: 10 ML | Refills: 3
Start: 2022-06-27

## 2022-06-27 NOTE — PROGRESS NOTES
2022    Assessment:       Diagnosis Orders   1. Hypogonadism in male  Testosterone Cypionate 200 MG/ML SOLN    Testosterone, free, total   2. Gynecomastia           PLAN:       Orders Placed This Encounter   Procedures    Testosterone, free, total     Standing Status:   Future     Standing Expiration Date:   2023     Start patient on testosterone 200 mg every 2 weeks through the testosterone clinic  Continue tamoxifen  O[facility] report was reviewed  Target testosterone level 500 midcycle  Orders Placed This Encounter   Medications    Testosterone Cypionate 200 MG/ML SOLN     Si cc every 2 weeks     Dispense:  10 mL     Refill:  3       Subjective:     Chief Complaint   Patient presents with    Follow-up     4 week f/u gynecomastia     Vitals:    22 1114 22 1118   BP: (!) 160/96 (!) 151/88   Site: Left Upper Arm Right Upper Arm   Position: Sitting Sitting   Cuff Size: Large Adult Large Adult   Pulse: 66    Temp: 97.7 °F (36.5 °C)    TempSrc: Temporal    SpO2: 98%    Weight: 224 lb (101.6 kg)    Height: 6' 2\" (1.88 m)      Wt Readings from Last 3 Encounters:   22 224 lb (101.6 kg)   22 222 lb (100.7 kg)   19 205 lb (93 kg)     BP Readings from Last 3 Encounters:   22 (!) 151/88   22 (!) 146/77   19 128/80     4-week follow-up on hypogonadism gynecomastia patient started on tamoxifen testosterone level was less than 200  Thyroid function test prolactin level was normal    Other  This is a recurrent (Hypogonadism) problem. The current episode started more than 1 month ago. The problem has been waxing and waning. Associated symptoms include fatigue. Nothing aggravates the symptoms. He has tried nothing for the symptoms. Results for Gerri Corona (MRN [id number]) as of 2022 22:30   Ref.  Range 2022 14:27   Prolactin Latest Ref Range: 4.0 - 15.2 ng/mL 10.8   Estradiol Latest Ref Range: 27 - 52 pg/mL 6.4 (L)   Sex Hormone Binding Latest Ref Range: 11 - 80 nmol/L 27   Testosterone Latest Ref Range: 220 - 1,000 ng/dL 193 (L)   Testosterone, Free Latest Ref Range: 47 - 244 pg/mL 41.0 (L)   TSH Latest Ref Range: 0.440 - 3.860 uIU/mL 0.910   T4 Free Latest Ref Range: 0.84 - 1.68 ng/dL 1.38       Past Medical History:   Diagnosis Date    Anxiety     Eczema     Hyperlipidemia     Hypogonadism male 2/6/2014    Palpitations     RBBB (right bundle branch block with left anterior fascicular block)      Past Surgical History:   Procedure Laterality Date    APPENDECTOMY      CARDIAC CATHETERIZATION      KNEE SURGERY      right     Social History     Socioeconomic History    Marital status:      Spouse name: Violeta Ribeiro     Number of children: 4    Years of education: Not on file    Highest education level: Not on file   Occupational History    Not on file   Tobacco Use    Smoking status: Never Smoker    Smokeless tobacco: Never Used   Substance and Sexual Activity    Alcohol use: Yes     Comment: rarely    Drug use: No    Sexual activity: Not on file   Other Topics Concern    Not on file   Social History Narrative    Not on file     Social Determinants of Health     Financial Resource Strain:     Difficulty of Paying Living Expenses: Not on file   Food Insecurity:     Worried About Running Out of Food in the Last Year: Not on file    [place]lin of Food in the Last Year: Not on file   Transportation Needs:     Lack of Transportation (Medical): Not on file    Lack of Transportation (Non-Medical):  Not on file   Physical Activity:     Days of Exercise per Week: Not on file    Minutes of Exercise per Session: Not on file   Stress:     Feeling of Stress : Not on file   Social Connections:     Frequency of Communication with Friends and Family: Not on file    Frequency of Social Gatherings with Friends and Family: Not on file    Attends Orthodoxy Services: Not on file    Active Member of Clubs or Organizations: Not on file    Attends Club or Organization Meetings: Not on file    Marital Status: Not on file   Intimate Partner Violence:     Fear of Current or Ex-Partner: Not on file    Emotionally Abused: Not on file    Physically Abused: Not on file    Sexually Abused: Not on file   Housing Stability:     Unable to Pay for Housing in the Last Year: Not on file    Number of [place] in the Last Year: Not on file    Unstable Housing in the Last Year: Not on file     No family history on file.   No Known Allergies    Current Outpatient Medications:     tamoxifen (NOLVADEX) 10 MG tablet, Take 1 tablet by mouth daily, Disp: 90 tablet, Rfl: 1    tiZANidine (ZANAFLEX) 4 MG tablet, Take 1 tablet by mouth 3 times daily, Disp: 30 tablet, Rfl: 0    azelastine (ASTELIN) 0.1 % nasal spray, 1 spray by Nasal route 2 times daily Use in each nostril as directed, Disp: 1 Bottle, Rfl: 3    atorvastatin (LIPITOR) 20 MG tablet, TAKE 1 TABLET BY MOUTH DAILY, Disp: 30 tablet, Rfl: 0    fluticasone (FLONASE) 50 MCG/ACT nasal spray, 2 sprays by Nasal route daily, Disp: 1 Bottle, Rfl: 1    tadalafil (CIALIS) 20 MG tablet, Take 1 tablet by mouth as needed for Erectile Dysfunction, Disp: 9 tablet, Rfl: 11    testosterone cypionate (DEPO-TESTOSTERONE) 200 MG/ML injection, Inject 400 mg into the muscle every 28 days, Disp: , Rfl:   Lab Results   Component Value Date     02/18/2016    K 4.7 02/18/2016    CL 98 02/18/2016    CO2 28 02/18/2016    BUN 13 02/18/2016    CREATININE 0.86 02/18/2016    GLUCOSE 82 02/18/2016    CALCIUM 9.7 02/18/2016    PROT 7.6 02/18/2016    LABALBU 5.0 (H) 02/18/2016    BILITOT 0.6 02/18/2016    ALKPHOS 59 02/18/2016    AST 21 02/18/2016    ALT 28 02/18/2016    LABGLOM >60.0 02/18/2016    GFRAA >60.0 02/18/2016    GLOB 2.6 02/18/2016     Lab Results   Component Value Date    WBC 5.9 02/18/2016    HGB 15.6 02/18/2016    HCT 46.2 02/18/2016    MCV 92.3 02/18/2016     02/18/2016     No results found for: LABA1C  Lab Results   Component Value Date    HDL 61 (H) 02/18/2016    HDL 54 04/01/2014    HDL 47 01/30/2014    LDLCALC 124 02/18/2016    LDLCALC 66 04/01/2014    LDLCALC 101 01/30/2014    CHOL 236 (H) 02/18/2016    CHOL 182 04/01/2014    CHOL 273 (H) 01/30/2014    TRIG 256 (H) 02/18/2016    TRIG 308 (H) 04/01/2014    TRIG 627 (H) 01/30/2014     Lab Results   Component Value Date    TESTM 426 03/20/2017    TESTM 237 (L) 02/18/2016    TESTM 517 12/03/2014     Lab Results   Component Value Date    TSH 0.910 05/05/2022    T4FREE 1.38 05/05/2022     No results found for: TPOABS    Review of Systems   Constitutional: Positive for fatigue. Cardiovascular: Negative. Endocrine: Negative. Neurological: Negative. All other systems reviewed and are negative. Objective:   Physical Exam  Vitals reviewed. Constitutional:       General: He is not in acute distress. Appearance: Normal appearance. HENT:      Head: Normocephalic and atraumatic. Right Ear: External ear normal.      Left Ear: External ear normal.      Nose: Nose normal.   Eyes:      General: No scleral icterus. Right eye: No discharge. Left eye: No discharge. Extraocular Movements: Extraocular movements intact. Conjunctiva/sclera: Conjunctivae normal.   Cardiovascular:      Rate and Rhythm: Normal rate. Pulmonary:      Effort: Pulmonary effort is normal.   Musculoskeletal:         General: Normal range of motion. Cervical back: Normal range of motion and neck supple. Neurological:      General: No focal deficit present. Mental Status: He is alert and oriented to person, place, and time.    Psychiatric:         Mood and Affect: Mood normal.         Behavior: Behavior normal.

## 2022-06-29 ENCOUNTER — NURSE ONLY (OUTPATIENT)
Dept: ENDOCRINOLOGY | Age: 57
End: 2022-06-29
Payer: COMMERCIAL

## 2022-06-29 DIAGNOSIS — E29.1 HYPOGONADISM IN MALE: Primary | ICD-10-CM

## 2022-06-29 PROCEDURE — 96372 THER/PROPH/DIAG INJ SC/IM: CPT | Performed by: INTERNAL MEDICINE

## 2022-06-29 RX ORDER — TESTOSTERONE CYPIONATE 200 MG/ML
200 INJECTION INTRAMUSCULAR ONCE
Status: COMPLETED | OUTPATIENT
Start: 2022-06-29 | End: 2022-06-29

## 2022-06-29 RX ADMIN — TESTOSTERONE CYPIONATE 200 MG: 200 INJECTION INTRAMUSCULAR at 14:20

## 2022-06-29 NOTE — PROGRESS NOTES
Patient given Testosterone 200mg IM right gluteal..  Will return in 2 weeks for next injection      Patient tolerated injection well.     Administrations This Visit     testosterone cypionate (DEPOTESTOTERONE CYPIONATE) injection 200 mg     Admin Date  06/29/2022 Action  Given Dose  200 mg Route  [facility] Administered By  [place], LPN

## 2022-07-13 ENCOUNTER — NURSE ONLY (OUTPATIENT)
Dept: ENDOCRINOLOGY | Age: 57
End: 2022-07-13
Payer: COMMERCIAL

## 2022-07-13 DIAGNOSIS — E29.1 HYPOGONADISM IN MALE: Primary | ICD-10-CM

## 2022-07-13 PROCEDURE — 96372 THER/PROPH/DIAG INJ SC/IM: CPT | Performed by: INTERNAL MEDICINE

## 2022-07-13 RX ORDER — TESTOSTERONE CYPIONATE 200 MG/ML
200 INJECTION INTRAMUSCULAR ONCE
Status: COMPLETED | OUTPATIENT
Start: 2022-07-13 | End: 2022-07-13

## 2022-07-13 RX ADMIN — TESTOSTERONE CYPIONATE 200 MG: 200 INJECTION INTRAMUSCULAR at 14:38

## 2022-07-13 NOTE — PROGRESS NOTES
Patient given Testosterone 200mg IM left gluteal..  Will return in 2 weeks for next injection    Patient tolerated injection well.     Administrations This Visit     testosterone cypionate (DEPOTESTOTERONE CYPIONATE) injection 200 mg     Admin Date  07/13/2022 Action  Given Dose  200 mg Route  [facility] Administered By  Regina Ureña LPN

## 2022-07-27 ENCOUNTER — NURSE ONLY (OUTPATIENT)
Dept: ENDOCRINOLOGY | Age: 57
End: 2022-07-27
Payer: COMMERCIAL

## 2022-07-27 DIAGNOSIS — E29.1 HYPOGONADISM IN MALE: Primary | ICD-10-CM

## 2022-07-27 PROCEDURE — 96372 THER/PROPH/DIAG INJ SC/IM: CPT | Performed by: INTERNAL MEDICINE

## 2022-07-27 RX ORDER — TESTOSTERONE CYPIONATE 200 MG/ML
200 INJECTION INTRAMUSCULAR ONCE
Status: COMPLETED | OUTPATIENT
Start: 2022-07-27 | End: 2022-07-27

## 2022-07-27 RX ADMIN — TESTOSTERONE CYPIONATE 200 MG: 200 INJECTION INTRAMUSCULAR at 14:09

## 2022-07-27 NOTE — PROGRESS NOTES
Patient given Testosterone 200mg IM right gluteal..  Will return in 2 weeks for next injection    Patient tolerated injection well.     Administrations This Visit       testosterone cypionate (DEPOTESTOTERONE CYPIONATE) injection 200 mg       Admin Date  07/27/2022 Action  Given Dose  200 mg Route  [facility] Administered By  Emily Goddard LPN

## 2022-08-10 ENCOUNTER — NURSE ONLY (OUTPATIENT)
Dept: ENDOCRINOLOGY | Age: 57
End: 2022-08-10
Payer: COMMERCIAL

## 2022-08-10 DIAGNOSIS — E29.1 HYPOGONADISM IN MALE: Primary | ICD-10-CM

## 2022-08-10 PROCEDURE — 96372 THER/PROPH/DIAG INJ SC/IM: CPT | Performed by: PHYSICIAN ASSISTANT

## 2022-08-10 RX ORDER — TESTOSTERONE CYPIONATE 200 MG/ML
200 INJECTION INTRAMUSCULAR ONCE
Status: COMPLETED | OUTPATIENT
Start: 2022-08-10 | End: 2022-08-10

## 2022-08-10 RX ADMIN — TESTOSTERONE CYPIONATE 200 MG: 200 INJECTION INTRAMUSCULAR at 14:48

## 2022-08-10 NOTE — PROGRESS NOTES
Patient given Testosterone 200mg IM left gluteal..  Will return in 2 weeks for next injection      Patient tolerated injection well.     Administrations This Visit       testosterone cypionate (DEPOTESTOTERONE CYPIONATE) injection 200 mg       Admin Date  08/10/2022 Action  Given Dose  200 mg Route  [facility] Administered By  Deborah Rollins LPN

## 2022-08-24 ENCOUNTER — NURSE ONLY (OUTPATIENT)
Dept: ENDOCRINOLOGY | Age: 57
End: 2022-08-24
Payer: COMMERCIAL

## 2022-08-24 DIAGNOSIS — E29.1 HYPOGONADISM IN MALE: Primary | ICD-10-CM

## 2022-08-24 PROCEDURE — 96372 THER/PROPH/DIAG INJ SC/IM: CPT | Performed by: INTERNAL MEDICINE

## 2022-08-24 RX ORDER — TESTOSTERONE CYPIONATE 200 MG/ML
200 INJECTION INTRAMUSCULAR ONCE
Status: COMPLETED | OUTPATIENT
Start: 2022-08-24 | End: 2022-08-24

## 2022-08-24 RX ADMIN — TESTOSTERONE CYPIONATE 200 MG: 200 INJECTION INTRAMUSCULAR at 14:06

## 2022-08-24 NOTE — PROGRESS NOTES
Patient given Testosterone 200mg IM right gluteal..  Will return in 2 weeks for next injection    Patient tolerated injection well.     Administrations This Visit       testosterone cypionate (DEPOTESTOTERONE CYPIONATE) injection 200 mg       Admin Date  08/24/2022 Action  Given Dose  200 mg Route  [facility] Administered By  Duke Fernandez LPN

## 2022-09-07 ENCOUNTER — NURSE ONLY (OUTPATIENT)
Dept: ENDOCRINOLOGY | Age: 57
End: 2022-09-07
Payer: COMMERCIAL

## 2022-09-07 DIAGNOSIS — E29.1 HYPOGONADISM IN MALE: Primary | ICD-10-CM

## 2022-09-07 PROCEDURE — 96372 THER/PROPH/DIAG INJ SC/IM: CPT | Performed by: INTERNAL MEDICINE

## 2022-09-07 RX ORDER — TESTOSTERONE CYPIONATE 200 MG/ML
200 INJECTION INTRAMUSCULAR ONCE
Status: COMPLETED | OUTPATIENT
Start: 2022-09-07 | End: 2022-09-07

## 2022-09-07 RX ADMIN — TESTOSTERONE CYPIONATE 200 MG: 200 INJECTION INTRAMUSCULAR at 14:08

## 2022-09-07 NOTE — PROGRESS NOTES
Patient given Testosterone 200mg IM left gluteal..  Will return in 2 weeks for next injection    Patient tolerated injection well.     Administrations This Visit       testosterone cypionate (DEPOTESTOTERONE CYPIONATE) injection 200 mg       Admin Date  09/07/2022 Action  Given Dose  200 mg Route  [facility] Administered By  Aydee Wolff LPN

## 2022-09-21 ENCOUNTER — NURSE ONLY (OUTPATIENT)
Dept: ENDOCRINOLOGY | Age: 57
End: 2022-09-21
Payer: COMMERCIAL

## 2022-09-21 DIAGNOSIS — E29.1 HYPOGONADISM IN MALE: Primary | ICD-10-CM

## 2022-09-21 PROCEDURE — 96372 THER/PROPH/DIAG INJ SC/IM: CPT | Performed by: INTERNAL MEDICINE

## 2022-09-21 RX ORDER — TESTOSTERONE CYPIONATE 200 MG/ML
200 INJECTION INTRAMUSCULAR ONCE
Status: COMPLETED | OUTPATIENT
Start: 2022-09-21 | End: 2022-09-21

## 2022-09-21 RX ADMIN — TESTOSTERONE CYPIONATE 200 MG: 200 INJECTION INTRAMUSCULAR at 15:01

## 2022-09-21 NOTE — PROGRESS NOTES
Patient given Testosterone 200mg IM right gluteal..  Will return in 2 weeks for next injection  Needs to recheck level in 1 week  Patient tolerated injection well.     Administrations This Visit       testosterone cypionate (DEPOTESTOTERONE CYPIONATE) injection 200 mg       Admin Date  09/21/2022 Action  Given Dose  200 mg Route  [facility] Administered By  Tita Lima LPN

## 2022-09-26 ENCOUNTER — OFFICE VISIT (OUTPATIENT)
Dept: ENDOCRINOLOGY | Age: 57
End: 2022-09-26
Payer: COMMERCIAL

## 2022-09-26 VITALS
BODY MASS INDEX: 25.8 KG/M2 | DIASTOLIC BLOOD PRESSURE: 80 MMHG | WEIGHT: 201 LBS | HEIGHT: 74 IN | SYSTOLIC BLOOD PRESSURE: 133 MMHG | OXYGEN SATURATION: 97 % | HEART RATE: 75 BPM

## 2022-09-26 DIAGNOSIS — E29.1 HYPOGONADISM IN MALE: ICD-10-CM

## 2022-09-26 DIAGNOSIS — N62 GYNECOMASTIA: ICD-10-CM

## 2022-09-26 DIAGNOSIS — E29.1 HYPOGONADISM IN MALE: Primary | ICD-10-CM

## 2022-09-26 PROCEDURE — 99213 OFFICE O/P EST LOW 20 MIN: CPT | Performed by: INTERNAL MEDICINE

## 2022-09-26 RX ORDER — TAMOXIFEN CITRATE 10 MG/1
10 TABLET ORAL DAILY
Qty: 90 TABLET | Refills: 1 | Status: SHIPPED | OUTPATIENT
Start: 2022-09-26

## 2022-09-26 NOTE — PROGRESS NOTES
9/26/2022    Assessment:       Diagnosis Orders   1. Hypogonadism in male        2. Gynecomastia              PLAN:     Orders Placed This Encounter   Procedures    Testosterone, free, total     Standing Status:   Future     Number of Occurrences:   1     Standing Expiration Date:   9/26/2023     Continue current dose of testosterone injections 200 mg every 2 weeks continue Nolvadex follow-up in 3 to 6 months  Orders Placed This Encounter   Medications    tamoxifen (NOLVADEX) 10 MG tablet     Sig: Take 1 tablet by mouth daily     Dispense:  90 tablet     Refill:  1       Subjective:     Chief Complaint   Patient presents with    Hypogonadism    Other     Gynecomastia     Vitals:    09/26/22 1440   BP: 133/80   Pulse: 75   SpO2: 97%   Weight: 201 lb (91.2 kg)   Height: 6' 2\" (1.88 m)     Wt Readings from Last 3 Encounters:   09/26/22 201 lb (91.2 kg)   06/27/22 224 lb (101.6 kg)   05/05/22 222 lb (100.7 kg)     BP Readings from Last 3 Encounters:   09/26/22 133/80   06/27/22 (!) 151/88   05/05/22 (!) 146/77     Follow-up on hypogonadism on testosterone injections last testosterone was more than 1000 symptoms also improving also Nolvadex gynecomastia with improved symptoms    Other  This is a chronic (Hypogonadism) problem. The current episode started more than 1 month ago. The problem has been gradually improving. Pertinent negatives include no fatigue. Nothing aggravates the symptoms. Treatments tried: Testosterone injections. The treatment provided significant relief.         Latest Reference Range & Units 5/5/22 14:27 9/26/22 15:00   Estradiol 27 - 52 pg/mL 6.4 (L)    Sex Hormone Binding 11 - 80 nmol/L 27 42   Testosterone 220 - 1,000 ng/dL 193 (L) 1,077 (H)   Testosterone, Free 47 - 244 pg/mL 41.0 (L) 230.2   (L): Data is abnormally low  (H): Data is abnormally high    Past Medical History:   Diagnosis Date    Anxiety     Eczema     Hyperlipidemia     Hypogonadism male 2/6/2014    Palpitations     RBBB (right bundle branch block with left anterior fascicular block)      Past Surgical History:   Procedure Laterality Date    APPENDECTOMY      CARDIAC CATHETERIZATION      KNEE SURGERY      right     Social History     Socioeconomic History    Marital status:      Spouse name: Bhakti Robert     Number of children: 4    Years of education: Not on file    Highest education level: Not on file   Occupational History    Not on file   Tobacco Use    Smoking status: Never    Smokeless tobacco: Never   Substance and Sexual Activity    Alcohol use: Yes     Comment: rarely    Drug use: No    Sexual activity: Not on file   Other Topics Concern    Not on file   Social History Narrative    Not on file     Social Determinants of Health     Financial Resource Strain: Not on file   Food Insecurity: Not on file   Transportation Needs: Not on file   Physical Activity: Not on file   Stress: Not on file   Social Connections: Not on file   Intimate Partner Violence: Not on file   Housing Stability: Not on file     No family history on file.   No Known Allergies    Current Outpatient Medications:     Testosterone Cypionate 200 MG/ML SOLN, 1 cc every 2 weeks, Disp: 10 mL, Rfl: 3    tamoxifen (NOLVADEX) 10 MG tablet, Take 1 tablet by mouth daily, Disp: 90 tablet, Rfl: 1    tiZANidine (ZANAFLEX) 4 MG tablet, Take 1 tablet by mouth 3 times daily, Disp: 30 tablet, Rfl: 0    azelastine (ASTELIN) 0.1 % nasal spray, 1 spray by Nasal route 2 times daily Use in each nostril as directed, Disp: 1 Bottle, Rfl: 3    atorvastatin (LIPITOR) 20 MG tablet, TAKE 1 TABLET BY MOUTH DAILY, Disp: 30 tablet, Rfl: 0    fluticasone (FLONASE) 50 MCG/ACT nasal spray, 2 sprays by Nasal route daily, Disp: 1 Bottle, Rfl: 1    tadalafil (CIALIS) 20 MG tablet, Take 1 tablet by mouth as needed for Erectile Dysfunction, Disp: 9 tablet, Rfl: 11    testosterone cypionate (DEPOTESTOTERONE CYPIONATE) 200 MG/ML injection, Inject 400 mg into the muscle every 28 days, Disp: , Rfl: Lab Results   Component Value Date     02/18/2016    K 4.7 02/18/2016    CL 98 02/18/2016    CO2 28 02/18/2016    BUN 13 02/18/2016    CREATININE 0.86 02/18/2016    GLUCOSE 82 02/18/2016    CALCIUM 9.7 02/18/2016    PROT 7.6 02/18/2016    LABALBU 5.0 (H) 02/18/2016    BILITOT 0.6 02/18/2016    ALKPHOS 59 02/18/2016    AST 21 02/18/2016    ALT 28 02/18/2016    LABGLOM >60.0 02/18/2016    GFRAA >60.0 02/18/2016    GLOB 2.6 02/18/2016     Lab Results   Component Value Date    WBC 5.9 02/18/2016    HGB 15.6 02/18/2016    HCT 46.2 02/18/2016    MCV 92.3 02/18/2016     02/18/2016     No results found for: LABA1C  Lab Results   Component Value Date    HDL 61 (H) 02/18/2016    HDL 54 04/01/2014    HDL 47 01/30/2014    LDLCALC 124 02/18/2016    LDLCALC 66 04/01/2014    LDLCALC 101 01/30/2014    CHOL 236 (H) 02/18/2016    CHOL 182 04/01/2014    CHOL 273 (H) 01/30/2014    TRIG 256 (H) 02/18/2016    TRIG 308 (H) 04/01/2014    TRIG 627 (H) 01/30/2014     Lab Results   Component Value Date    TESTM 426 03/20/2017    TESTM 237 (L) 02/18/2016    TESTM 517 12/03/2014     Lab Results   Component Value Date    TSH 0.910 05/05/2022    T4FREE 1.38 05/05/2022     No results found for: TPOABS    Review of Systems   Constitutional:  Negative for fatigue. Eyes: Negative. Cardiovascular: Negative. Endocrine: Negative. All other systems reviewed and are negative. Objective:   Physical Exam  Vitals reviewed. Constitutional:       General: He is not in acute distress. Appearance: Normal appearance. HENT:      Head: Normocephalic and atraumatic. Right Ear: External ear normal.      Left Ear: External ear normal.      Nose: Nose normal.   Eyes:      General: No scleral icterus. Right eye: No discharge. Left eye: No discharge. Extraocular Movements: Extraocular movements intact. Conjunctiva/sclera: Conjunctivae normal.   Cardiovascular:      Rate and Rhythm: Normal rate. Pulmonary:      Effort: Pulmonary effort is normal.   Musculoskeletal:         General: Normal range of motion. Cervical back: Normal range of motion and neck supple. Neurological:      General: No focal deficit present. Mental Status: He is alert and oriented to person, place, and time.    Psychiatric:         Mood and Affect: Mood normal.         Behavior: Behavior normal.

## 2022-09-27 LAB
SEX HORMONE BINDING GLOBULIN: 42 NMOL/L (ref 11–80)
TESTOSTERONE FREE-NONMALE: 230.2 PG/ML (ref 47–244)
TESTOSTERONE TOTAL: 1077 NG/DL (ref 220–1000)

## 2022-10-05 ENCOUNTER — NURSE ONLY (OUTPATIENT)
Dept: ENDOCRINOLOGY | Age: 57
End: 2022-10-05
Payer: COMMERCIAL

## 2022-10-05 DIAGNOSIS — E29.1 HYPOGONADISM IN MALE: Primary | ICD-10-CM

## 2022-10-05 PROCEDURE — 96372 THER/PROPH/DIAG INJ SC/IM: CPT | Performed by: INTERNAL MEDICINE

## 2022-10-05 RX ORDER — TESTOSTERONE CYPIONATE 200 MG/ML
200 INJECTION INTRAMUSCULAR ONCE
Status: COMPLETED | OUTPATIENT
Start: 2022-10-05 | End: 2022-10-05

## 2022-10-05 RX ADMIN — TESTOSTERONE CYPIONATE 200 MG: 200 INJECTION INTRAMUSCULAR at 14:34

## 2022-10-05 NOTE — PROGRESS NOTES
Patient given Testosterone 200mg IM left gluteal..  Will return in 2 weeks for next injection    Patient tolerated injection well.     Administrations This Visit       testosterone cypionate (DEPOTESTOTERONE CYPIONATE) injection 200 mg       Admin Date  10/05/2022 Action  Given Dose  200 mg Route  [facility] Administered By  Sharmin Lucero LPN

## 2022-10-09 ASSESSMENT — ENCOUNTER SYMPTOMS: EYES NEGATIVE: 1

## 2022-10-19 ENCOUNTER — NURSE ONLY (OUTPATIENT)
Dept: ENDOCRINOLOGY | Age: 57
End: 2022-10-19
Payer: COMMERCIAL

## 2022-10-19 DIAGNOSIS — E29.1 HYPOGONADISM IN MALE: Primary | ICD-10-CM

## 2022-10-19 PROCEDURE — 96372 THER/PROPH/DIAG INJ SC/IM: CPT | Performed by: INTERNAL MEDICINE

## 2022-10-19 RX ORDER — TESTOSTERONE CYPIONATE 200 MG/ML
200 INJECTION INTRAMUSCULAR ONCE
Status: COMPLETED | OUTPATIENT
Start: 2022-10-19 | End: 2022-10-19

## 2022-10-19 RX ADMIN — TESTOSTERONE CYPIONATE 200 MG: 200 INJECTION INTRAMUSCULAR at 14:23

## 2022-10-19 NOTE — PROGRESS NOTES
Patient given Testosterone 200mg IM right gluteal..  Will return in 2 weeks for next injection    Patient tolerated injection well.     Administrations This Visit       testosterone cypionate (DEPOTESTOTERONE CYPIONATE) injection 200 mg       Admin Date  10/19/2022 Action  Given Dose  200 mg Route  [facility] Administered By  Izabela Reynoso LPN

## 2022-11-16 ENCOUNTER — NURSE ONLY (OUTPATIENT)
Dept: ENDOCRINOLOGY | Age: 57
End: 2022-11-16
Payer: COMMERCIAL

## 2022-11-16 DIAGNOSIS — E29.1 HYPOGONADISM IN MALE: Primary | ICD-10-CM

## 2022-11-16 PROCEDURE — 96372 THER/PROPH/DIAG INJ SC/IM: CPT | Performed by: INTERNAL MEDICINE

## 2022-11-16 RX ORDER — TESTOSTERONE CYPIONATE 200 MG/ML
200 INJECTION INTRAMUSCULAR ONCE
Status: COMPLETED | OUTPATIENT
Start: 2022-11-16 | End: 2022-11-16

## 2022-11-16 RX ADMIN — TESTOSTERONE CYPIONATE 200 MG: 200 INJECTION INTRAMUSCULAR at 14:29

## 2022-11-16 NOTE — PROGRESS NOTES
Patient given Testosterone 200mg IM left gluteal..  Will return in 2 weeks for next injection    Patient tolerated injection well.     Administrations This Visit       testosterone cypionate (DEPOTESTOTERONE CYPIONATE) injection 200 mg       Admin Date  11/16/2022 Action  Given Dose  200 mg Route  [facility] Administered By  Carmen Bravo LPN

## 2022-11-30 ENCOUNTER — NURSE ONLY (OUTPATIENT)
Dept: ENDOCRINOLOGY | Age: 57
End: 2022-11-30
Payer: COMMERCIAL

## 2022-11-30 DIAGNOSIS — E29.1 HYPOGONADISM IN MALE: ICD-10-CM

## 2022-11-30 DIAGNOSIS — Z23 NEED FOR IMMUNIZATION AGAINST INFLUENZA: Primary | ICD-10-CM

## 2022-11-30 PROCEDURE — 90471 IMMUNIZATION ADMIN: CPT | Performed by: INTERNAL MEDICINE

## 2022-11-30 PROCEDURE — 96372 THER/PROPH/DIAG INJ SC/IM: CPT | Performed by: INTERNAL MEDICINE

## 2022-11-30 PROCEDURE — 90674 CCIIV4 VAC NO PRSV 0.5 ML IM: CPT | Performed by: INTERNAL MEDICINE

## 2022-11-30 RX ORDER — TESTOSTERONE CYPIONATE 200 MG/ML
200 INJECTION INTRAMUSCULAR ONCE
Status: COMPLETED | OUTPATIENT
Start: 2022-11-30 | End: 2022-11-30

## 2022-11-30 RX ADMIN — TESTOSTERONE CYPIONATE 200 MG: 200 INJECTION INTRAMUSCULAR at 15:12

## 2022-11-30 NOTE — PROGRESS NOTES
Patient given Testosterone 200mg IM right gluteal..  Will return in 2 weeks for next injection    Patient tolerated injection well.     Administrations This Visit       testosterone cypionate (DEPOTESTOTERONE CYPIONATE) injection 200 mg       Admin Date  11/30/2022 Action  Given Dose  200 mg Route  [facility] Administered By  Pierre Zayas LPN

## 2022-12-14 ENCOUNTER — NURSE ONLY (OUTPATIENT)
Dept: ENDOCRINOLOGY | Age: 57
End: 2022-12-14
Payer: COMMERCIAL

## 2022-12-14 DIAGNOSIS — E29.1 HYPOGONADISM IN MALE: Primary | ICD-10-CM

## 2022-12-14 PROCEDURE — 96372 THER/PROPH/DIAG INJ SC/IM: CPT | Performed by: INTERNAL MEDICINE

## 2022-12-14 RX ORDER — TESTOSTERONE CYPIONATE 200 MG/ML
200 INJECTION INTRAMUSCULAR ONCE
Status: COMPLETED | OUTPATIENT
Start: 2022-12-14 | End: 2022-12-14

## 2022-12-14 RX ADMIN — TESTOSTERONE CYPIONATE 200 MG: 200 INJECTION INTRAMUSCULAR at 14:27

## 2022-12-14 NOTE — PROGRESS NOTES
Patient given Testosterone 200mg IM left gluteal..  Will return in 2 weeks for next injection    Patient tolerated injection well.     Administrations This Visit       testosterone cypionate (DEPOTESTOTERONE CYPIONATE) injection 200 mg       Admin Date  12/14/2022 Action  Given Dose  200 mg Route  [facility] Administered By  Delia Olson LPN

## 2022-12-29 ENCOUNTER — NURSE ONLY (OUTPATIENT)
Dept: ENDOCRINOLOGY | Age: 57
End: 2022-12-29
Payer: COMMERCIAL

## 2022-12-29 DIAGNOSIS — E29.1 HYPOGONADISM IN MALE: Primary | ICD-10-CM

## 2022-12-29 PROCEDURE — 96372 THER/PROPH/DIAG INJ SC/IM: CPT | Performed by: PHYSICIAN ASSISTANT

## 2022-12-29 RX ORDER — TESTOSTERONE CYPIONATE 200 MG/ML
200 INJECTION INTRAMUSCULAR ONCE
Status: COMPLETED | OUTPATIENT
Start: 2022-12-29 | End: 2022-12-29

## 2022-12-29 RX ADMIN — TESTOSTERONE CYPIONATE 200 MG: 200 INJECTION INTRAMUSCULAR at 14:16

## 2022-12-29 NOTE — PROGRESS NOTES
Patient given Testosterone 200mg IM right gluteal..  Will return in 2 weeks for next injection    Patient tolerated injection well.     Administrations This Visit       testosterone cypionate (DEPOTESTOTERONE CYPIONATE) injection 200 mg       Admin Date  12/29/2022 Action  Given Dose  200 mg Route  [facility] Administered By  Nola Martinez LPN

## 2023-01-11 ENCOUNTER — NURSE ONLY (OUTPATIENT)
Dept: ENDOCRINOLOGY | Age: 58
End: 2023-01-11
Payer: COMMERCIAL

## 2023-01-11 DIAGNOSIS — E29.1 HYPOGONADISM IN MALE: Primary | ICD-10-CM

## 2023-01-11 PROCEDURE — 96372 THER/PROPH/DIAG INJ SC/IM: CPT | Performed by: INTERNAL MEDICINE

## 2023-01-11 RX ORDER — TESTOSTERONE CYPIONATE 200 MG/ML
200 INJECTION INTRAMUSCULAR ONCE
Status: COMPLETED | OUTPATIENT
Start: 2023-01-11 | End: 2023-01-11

## 2023-01-11 RX ADMIN — TESTOSTERONE CYPIONATE 200 MG: 200 INJECTION INTRAMUSCULAR at 13:20

## 2023-01-11 NOTE — PROGRESS NOTES
Patient given Testosterone 200mg IM left gluteal..  Will return in 2 weeks for next injection    Patient tolerated injection well.     Administrations This Visit       testosterone cypionate (DEPOTESTOTERONE CYPIONATE) injection 200 mg       Admin Date  01/11/2023 Action  Given Dose  200 mg Route  [facility] Administered By  Matthew Banerjee LPN

## 2023-01-25 ENCOUNTER — NURSE ONLY (OUTPATIENT)
Dept: ENDOCRINOLOGY | Age: 58
End: 2023-01-25
Payer: COMMERCIAL

## 2023-01-25 DIAGNOSIS — E29.1 HYPOGONADISM IN MALE: Primary | ICD-10-CM

## 2023-01-25 PROCEDURE — 96372 THER/PROPH/DIAG INJ SC/IM: CPT | Performed by: INTERNAL MEDICINE

## 2023-01-25 RX ORDER — TESTOSTERONE CYPIONATE 200 MG/ML
200 INJECTION INTRAMUSCULAR ONCE
Status: COMPLETED | OUTPATIENT
Start: 2023-01-25 | End: 2023-01-25

## 2023-01-25 RX ADMIN — TESTOSTERONE CYPIONATE 200 MG: 200 INJECTION INTRAMUSCULAR at 14:23

## 2023-01-25 NOTE — PROGRESS NOTES
Patient given Testosterone 200mg IM right gluteal..  Will return in 2 weeks for next injection    Patient tolerated injection well.     Administrations This Visit       testosterone cypionate (DEPOTESTOTERONE CYPIONATE) injection 200 mg       Admin Date  01/25/2023 Action  Given Dose  200 mg Route  [facility] Administered By  Eze Hennessy LPN

## 2023-02-08 ENCOUNTER — NURSE ONLY (OUTPATIENT)
Dept: ENDOCRINOLOGY | Age: 58
End: 2023-02-08
Payer: COMMERCIAL

## 2023-02-08 DIAGNOSIS — E29.1 HYPOGONADISM IN MALE: Primary | ICD-10-CM

## 2023-02-08 PROCEDURE — 96372 THER/PROPH/DIAG INJ SC/IM: CPT | Performed by: INTERNAL MEDICINE

## 2023-02-08 RX ORDER — TESTOSTERONE CYPIONATE 200 MG/ML
200 INJECTION INTRAMUSCULAR ONCE
Status: COMPLETED | OUTPATIENT
Start: 2023-02-08 | End: 2023-02-08

## 2023-02-08 RX ADMIN — TESTOSTERONE CYPIONATE 200 MG: 200 INJECTION INTRAMUSCULAR at 14:54

## 2023-02-08 NOTE — PROGRESS NOTES
Patient given Testosterone 200mg IM left gluteal..  Will return in 2 weeks for next injection    Patient tolerated injection well.     Administrations This Visit       testosterone cypionate (DEPOTESTOTERONE CYPIONATE) injection 200 mg       Admin Date  02/08/2023 Action  Given Dose  200 mg Route  [facility] Administered By  Jannet Mann LPN

## 2023-02-22 ENCOUNTER — NURSE ONLY (OUTPATIENT)
Dept: FAMILY MEDICINE CLINIC | Age: 58
End: 2023-02-22
Payer: COMMERCIAL

## 2023-02-22 DIAGNOSIS — E29.1 HYPOGONADISM MALE: Primary | ICD-10-CM

## 2023-02-22 PROCEDURE — 96372 THER/PROPH/DIAG INJ SC/IM: CPT | Performed by: FAMILY MEDICINE

## 2023-02-22 RX ORDER — TESTOSTERONE CYPIONATE 200 MG/ML
200 INJECTION INTRAMUSCULAR ONCE
Status: COMPLETED | OUTPATIENT
Start: 2023-02-22 | End: 2023-02-22

## 2023-02-22 RX ADMIN — TESTOSTERONE CYPIONATE 200 MG: 200 INJECTION INTRAMUSCULAR at 15:14

## 2023-02-22 NOTE — PROGRESS NOTES
Patient given Testosterone 200mg IM right gluteal..  Will return in 2 weeks for next injection    Patient tolerated injection well.     Administrations This Visit       testosterone cypionate (DEPOTESTOTERONE CYPIONATE) injection 200 mg       Admin Date  02/22/2023 Action  Given Dose  200 mg Route  [facility] Administered By  Blank Ewing LPN

## 2023-03-08 ENCOUNTER — NURSE ONLY (OUTPATIENT)
Dept: ENDOCRINOLOGY | Age: 58
End: 2023-03-08
Payer: COMMERCIAL

## 2023-03-08 DIAGNOSIS — E29.1 HYPOGONADISM IN MALE: Primary | ICD-10-CM

## 2023-03-08 PROCEDURE — 96372 THER/PROPH/DIAG INJ SC/IM: CPT | Performed by: INTERNAL MEDICINE

## 2023-03-08 RX ORDER — TESTOSTERONE CYPIONATE 200 MG/ML
200 INJECTION INTRAMUSCULAR ONCE
Status: COMPLETED | OUTPATIENT
Start: 2023-03-08 | End: 2023-03-08

## 2023-03-08 RX ADMIN — TESTOSTERONE CYPIONATE 200 MG: 200 INJECTION INTRAMUSCULAR at 15:14

## 2023-03-08 NOTE — PROGRESS NOTES
Patient given Testosterone 200mg IM left gluteal..  Will return in 2 weeks for next injection    Patient tolerated injection well.     Administrations This Visit       testosterone cypionate (DEPOTESTOTERONE CYPIONATE) injection 200 mg       Admin Date  03/08/2023 Action  Given Dose  200 mg Route  [facility] Administered By  Anna Shannon LPN

## 2023-03-22 ENCOUNTER — NURSE ONLY (OUTPATIENT)
Dept: ENDOCRINOLOGY | Age: 58
End: 2023-03-22
Payer: COMMERCIAL

## 2023-03-22 DIAGNOSIS — E29.1 HYPOGONADISM IN MALE: Primary | ICD-10-CM

## 2023-03-22 PROCEDURE — 96372 THER/PROPH/DIAG INJ SC/IM: CPT | Performed by: INTERNAL MEDICINE

## 2023-03-22 RX ORDER — TESTOSTERONE CYPIONATE 200 MG/ML
200 INJECTION, SOLUTION INTRAMUSCULAR ONCE
Status: COMPLETED | OUTPATIENT
Start: 2023-03-22 | End: 2023-03-22

## 2023-03-22 RX ADMIN — TESTOSTERONE CYPIONATE 200 MG: 200 INJECTION, SOLUTION INTRAMUSCULAR at 15:46

## 2023-03-22 NOTE — PROGRESS NOTES
Patient given Testosterone 200mg IM right gluteal..  Will return in 2 weeks for next injection    Patient tolerated injection well.     Administrations This Visit       testosterone cypionate (DEPOTESTOTERONE CYPIONATE) injection 200 mg       Admin Date  03/22/2023 Action  Given Dose  200 mg Route  [facility] Administered By  Jakcie Thomason LPN

## 2023-03-29 DIAGNOSIS — E29.1 HYPOGONADISM IN MALE: ICD-10-CM

## 2023-03-30 LAB
SHBG SERPL-SCNC: 37 NMOL/L (ref 11–80)
TESTOST FREE SERPL-MCNC: 275.9 PG/ML (ref 47–244)
TESTOST SERPL-MCNC: 1168 NG/DL (ref 220–1000)

## 2023-04-05 ENCOUNTER — NURSE ONLY (OUTPATIENT)
Dept: ENDOCRINOLOGY | Age: 58
End: 2023-04-05
Payer: COMMERCIAL

## 2023-04-05 DIAGNOSIS — E29.1 HYPOGONADISM IN MALE: Primary | ICD-10-CM

## 2023-04-05 PROCEDURE — 96372 THER/PROPH/DIAG INJ SC/IM: CPT | Performed by: INTERNAL MEDICINE

## 2023-04-05 RX ORDER — TESTOSTERONE CYPIONATE 200 MG/ML
200 INJECTION, SOLUTION INTRAMUSCULAR ONCE
Status: COMPLETED | OUTPATIENT
Start: 2023-04-05 | End: 2023-04-05

## 2023-04-05 RX ADMIN — TESTOSTERONE CYPIONATE 200 MG: 200 INJECTION, SOLUTION INTRAMUSCULAR at 15:10

## 2023-04-19 ENCOUNTER — NURSE ONLY (OUTPATIENT)
Dept: ENDOCRINOLOGY | Age: 58
End: 2023-04-19
Payer: COMMERCIAL

## 2023-04-19 DIAGNOSIS — E29.1 HYPOGONADISM MALE: Primary | ICD-10-CM

## 2023-04-19 PROCEDURE — 96372 THER/PROPH/DIAG INJ SC/IM: CPT | Performed by: PHYSICIAN ASSISTANT

## 2023-04-19 RX ORDER — TESTOSTERONE CYPIONATE 200 MG/ML
200 INJECTION, SOLUTION INTRAMUSCULAR ONCE
Status: COMPLETED | OUTPATIENT
Start: 2023-04-19 | End: 2023-04-19

## 2023-04-19 RX ADMIN — TESTOSTERONE CYPIONATE 200 MG: 200 INJECTION, SOLUTION INTRAMUSCULAR at 15:19

## 2023-04-19 NOTE — PROGRESS NOTES
Patient given Testosterone 200 mg IM right gluteal..  Will return in 2 weeks for next injection    Patient tolerated injection well.     Administrations This Visit       testosterone cypionate (DEPOTESTOTERONE CYPIONATE) injection 200 mg       Admin Date  04/19/2023 Action  Given Dose  200 mg Route  [facility] Administered By  JANICE Perez

## 2023-05-03 ENCOUNTER — NURSE ONLY (OUTPATIENT)
Dept: ENDOCRINOLOGY | Age: 58
End: 2023-05-03
Payer: COMMERCIAL

## 2023-05-03 DIAGNOSIS — E29.1 HYPOGONADISM MALE: Primary | ICD-10-CM

## 2023-05-03 PROCEDURE — 96372 THER/PROPH/DIAG INJ SC/IM: CPT | Performed by: INTERNAL MEDICINE

## 2023-05-03 RX ORDER — TESTOSTERONE CYPIONATE 200 MG/ML
200 INJECTION, SOLUTION INTRAMUSCULAR ONCE
Status: COMPLETED | OUTPATIENT
Start: 2023-05-03 | End: 2023-05-03

## 2023-05-03 RX ADMIN — TESTOSTERONE CYPIONATE 200 MG: 200 INJECTION, SOLUTION INTRAMUSCULAR at 15:05

## 2023-05-03 NOTE — PROGRESS NOTES
Patient given Testosterone 200mg IM left gluteal..  Will return in 2 weeks for next injection    Patient tolerated injection well.     Administrations This Visit       testosterone cypionate (DEPOTESTOTERONE CYPIONATE) injection 200 mg       Admin Date  05/03/2023 Action  Given Dose  200 mg Route  [facility] Administered By  Wilhemenia Snellen, LPN

## 2023-05-17 ENCOUNTER — NURSE ONLY (OUTPATIENT)
Dept: ENDOCRINOLOGY | Age: 58
End: 2023-05-17
Payer: COMMERCIAL

## 2023-05-17 DIAGNOSIS — E29.1 HYPOGONADISM MALE: Primary | ICD-10-CM

## 2023-05-17 PROCEDURE — 96372 THER/PROPH/DIAG INJ SC/IM: CPT | Performed by: INTERNAL MEDICINE

## 2023-05-17 RX ORDER — TESTOSTERONE CYPIONATE 200 MG/ML
200 INJECTION, SOLUTION INTRAMUSCULAR ONCE
Status: COMPLETED | OUTPATIENT
Start: 2023-05-17 | End: 2023-05-17

## 2023-05-17 RX ADMIN — TESTOSTERONE CYPIONATE 200 MG: 200 INJECTION, SOLUTION INTRAMUSCULAR at 14:45

## 2023-05-17 NOTE — PROGRESS NOTES
Patient given Testosterone 200mg IM right gluteal..  Will return in 2 weeks for next injection    Patient tolerated injection well.     Administrations This Visit       testosterone cypionate (DEPOTESTOTERONE CYPIONATE) injection 200 mg       Admin Date  05/17/2023 Action  Given Dose  200 mg Route  [facility] Administered By  Sharyle Domino, LPN

## 2023-05-30 ENCOUNTER — NURSE ONLY (OUTPATIENT)
Dept: FAMILY MEDICINE CLINIC | Age: 58
End: 2023-05-30
Payer: COMMERCIAL

## 2023-05-30 DIAGNOSIS — E29.1 HYPOGONADISM MALE: Primary | ICD-10-CM

## 2023-05-30 PROCEDURE — 96372 THER/PROPH/DIAG INJ SC/IM: CPT | Performed by: FAMILY MEDICINE

## 2023-05-30 RX ORDER — TESTOSTERONE CYPIONATE 200 MG/ML
200 INJECTION, SOLUTION INTRAMUSCULAR ONCE
Status: COMPLETED | OUTPATIENT
Start: 2023-05-30 | End: 2023-05-30

## 2023-05-30 RX ADMIN — TESTOSTERONE CYPIONATE 200 MG: 200 INJECTION, SOLUTION INTRAMUSCULAR at 15:09

## 2023-05-30 NOTE — PROGRESS NOTES
Patient given Testosterone 200mg IM left gluteal..  Will return in 2 weeks for next injection    Patient tolerated injection well.     Administrations This Visit       testosterone cypionate (DEPOTESTOTERONE CYPIONATE) injection 200 mg       Admin Date  05/30/2023 Action  Given Dose  200 mg Route  [facility] Administered By  Stephanie Martins LPN

## 2023-06-28 ENCOUNTER — NURSE ONLY (OUTPATIENT)
Dept: ENDOCRINOLOGY | Age: 58
End: 2023-06-28
Payer: COMMERCIAL

## 2023-06-28 DIAGNOSIS — E29.1 HYPOGONADISM MALE: Primary | ICD-10-CM

## 2023-06-28 PROCEDURE — 96372 THER/PROPH/DIAG INJ SC/IM: CPT | Performed by: INTERNAL MEDICINE

## 2023-06-28 RX ORDER — TESTOSTERONE CYPIONATE 200 MG/ML
200 INJECTION, SOLUTION INTRAMUSCULAR ONCE
Status: COMPLETED | OUTPATIENT
Start: 2023-06-28 | End: 2023-06-28

## 2023-06-28 RX ADMIN — TESTOSTERONE CYPIONATE 200 MG: 200 INJECTION, SOLUTION INTRAMUSCULAR at 14:03

## 2023-07-12 ENCOUNTER — NURSE ONLY (OUTPATIENT)
Dept: ENDOCRINOLOGY | Age: 58
End: 2023-07-12
Payer: COMMERCIAL

## 2023-07-12 DIAGNOSIS — E29.1 HYPOGONADISM MALE: Primary | ICD-10-CM

## 2023-07-12 PROCEDURE — 96372 THER/PROPH/DIAG INJ SC/IM: CPT | Performed by: INTERNAL MEDICINE

## 2023-07-12 RX ORDER — TESTOSTERONE CYPIONATE 200 MG/ML
200 INJECTION, SOLUTION INTRAMUSCULAR ONCE
Status: COMPLETED | OUTPATIENT
Start: 2023-07-12 | End: 2023-07-12

## 2023-07-12 RX ADMIN — TESTOSTERONE CYPIONATE 200 MG: 200 INJECTION, SOLUTION INTRAMUSCULAR at 14:20

## 2023-07-12 NOTE — PROGRESS NOTES
Patient given Testosterone 200mg IM right gluteal..  Will return in 2 weeks for next injection    Patient tolerated injection well.     Administrations This Visit       testosterone cypionate (DEPOTESTOTERONE CYPIONATE) injection 200 mg       Admin Date  07/12/2023 Action  Given Dose  200 mg Route  [facility] Administered By  Michelle Arango LPN

## 2023-07-24 ENCOUNTER — NURSE ONLY (OUTPATIENT)
Dept: FAMILY MEDICINE CLINIC | Age: 58
End: 2023-07-24
Payer: COMMERCIAL

## 2023-07-24 DIAGNOSIS — E29.1 HYPOGONADISM MALE: Primary | ICD-10-CM

## 2023-07-24 PROCEDURE — 96372 THER/PROPH/DIAG INJ SC/IM: CPT | Performed by: FAMILY MEDICINE

## 2023-07-24 RX ORDER — TESTOSTERONE CYPIONATE 200 MG/ML
200 INJECTION, SOLUTION INTRAMUSCULAR ONCE
Status: COMPLETED | OUTPATIENT
Start: 2023-07-24 | End: 2023-07-24

## 2023-07-24 RX ADMIN — TESTOSTERONE CYPIONATE 200 MG: 200 INJECTION, SOLUTION INTRAMUSCULAR at 14:54

## 2023-07-24 NOTE — PROGRESS NOTES
Patient given Testosterone 200mg IM left gluteal..  Will return in 2 weeks for next injection    Patient tolerated injection well.     Administrations This Visit       testosterone cypionate (DEPOTESTOTERONE CYPIONATE) injection 200 mg       Admin Date  07/24/2023 Action  Given Dose  200 mg Route  [facility] Administered By  Jaime Ellsworth LPN

## 2023-07-26 ENCOUNTER — OFFICE VISIT (OUTPATIENT)
Dept: ENDOCRINOLOGY | Age: 58
End: 2023-07-26
Payer: COMMERCIAL

## 2023-07-26 VITALS
OXYGEN SATURATION: 98 % | WEIGHT: 204 LBS | SYSTOLIC BLOOD PRESSURE: 125 MMHG | BODY MASS INDEX: 26.18 KG/M2 | HEIGHT: 74 IN | DIASTOLIC BLOOD PRESSURE: 68 MMHG | HEART RATE: 75 BPM

## 2023-07-26 DIAGNOSIS — N62 GYNECOMASTIA: ICD-10-CM

## 2023-07-26 DIAGNOSIS — E29.1 HYPOGONADISM IN MALE: ICD-10-CM

## 2023-07-26 DIAGNOSIS — E29.1 HYPOGONADISM MALE: Primary | ICD-10-CM

## 2023-07-26 PROCEDURE — 99213 OFFICE O/P EST LOW 20 MIN: CPT | Performed by: INTERNAL MEDICINE

## 2023-07-26 RX ORDER — TESTOSTERONE CYPIONATE 200 MG/ML
VIAL (ML) INTRAMUSCULAR
Qty: 10 ML | Refills: 3
Start: 2023-07-26

## 2023-07-26 NOTE — PROGRESS NOTES
(L): Data is abnormally low  (H): Data is abnormally high    Past Medical History:   Diagnosis Date    Anxiety     Eczema     Hyperlipidemia     Hypogonadism male 2/6/2014    Palpitations     RBBB (right bundle branch block with left anterior fascicular block)      Past Surgical History:   Procedure Laterality Date    APPENDECTOMY      CARDIAC CATHETERIZATION      KNEE SURGERY      right     Social History     Socioeconomic History    Marital status:      Spouse name: Alysia Gates     Number of children: 4    Years of education: Not on file    Highest education level: Not on file   Occupational History    Not on file   Tobacco Use    Smoking status: Never    Smokeless tobacco: Never   Substance and Sexual Activity    Alcohol use: Yes     Comment: rarely    Drug use: No    Sexual activity: Not on file   Other Topics Concern    Not on file   Social History Narrative    Not on file     Social Determinants of Health     Financial Resource Strain: Not on file   Food Insecurity: Not on file   Transportation Needs: Not on file   Physical Activity: Not on file   Stress: Not on file   Social Connections: Not on file   Intimate Partner Violence: Not on file   Housing Stability: Not on file     No family history on file.   No Known Allergies    Current Outpatient Medications:     tamoxifen (NOLVADEX) 10 MG tablet, Take 1 tablet by mouth daily, Disp: 90 tablet, Rfl: 1    Testosterone Cypionate 200 MG/ML SOLN, 1 cc every 2 weeks, Disp: 10 mL, Rfl: 3    tiZANidine (ZANAFLEX) 4 MG tablet, Take 1 tablet by mouth 3 times daily, Disp: 30 tablet, Rfl: 0    azelastine (ASTELIN) 0.1 % nasal spray, 1 spray by Nasal route 2 times daily Use in each nostril as directed, Disp: 1 Bottle, Rfl: 3    atorvastatin (LIPITOR) 20 MG tablet, TAKE 1 TABLET BY MOUTH DAILY, Disp: 30 tablet, Rfl: 0    fluticasone (FLONASE) 50 MCG/ACT nasal spray, 2 sprays by Nasal route daily, Disp: 1 Bottle, Rfl: 1    tadalafil (CIALIS) 20 MG tablet, Take 1

## 2023-07-31 PROBLEM — F41.9 ANXIETY: Status: ACTIVE | Noted: 2023-07-31

## 2023-07-31 PROBLEM — M54.9 BACK PAIN: Status: ACTIVE | Noted: 2023-07-31

## 2023-07-31 PROBLEM — N52.9 ERECTILE DYSFUNCTION: Status: ACTIVE | Noted: 2023-07-31

## 2023-07-31 PROBLEM — E78.5 HYPERLIPIDEMIA: Status: ACTIVE | Noted: 2023-07-31

## 2023-07-31 PROBLEM — N62 GYNECOMASTIA, MALE: Status: ACTIVE | Noted: 2023-07-31

## 2023-07-31 PROBLEM — N40.0 BPH (BENIGN PROSTATIC HYPERPLASIA): Status: ACTIVE | Noted: 2023-07-31

## 2023-08-01 ENCOUNTER — OFFICE VISIT (OUTPATIENT)
Dept: PRIMARY CARE | Facility: CLINIC | Age: 58
End: 2023-08-01
Payer: COMMERCIAL

## 2023-08-01 ENCOUNTER — TELEPHONE (OUTPATIENT)
Dept: PRIMARY CARE | Facility: CLINIC | Age: 58
End: 2023-08-01

## 2023-08-01 VITALS
HEART RATE: 67 BPM | WEIGHT: 205.4 LBS | TEMPERATURE: 98.6 F | SYSTOLIC BLOOD PRESSURE: 132 MMHG | DIASTOLIC BLOOD PRESSURE: 70 MMHG | RESPIRATION RATE: 18 BRPM | BODY MASS INDEX: 26.37 KG/M2 | OXYGEN SATURATION: 98 %

## 2023-08-01 DIAGNOSIS — Z12.11 SCREEN FOR COLON CANCER: ICD-10-CM

## 2023-08-01 DIAGNOSIS — N40.0 BENIGN PROSTATIC HYPERPLASIA, UNSPECIFIED WHETHER LOWER URINARY TRACT SYMPTOMS PRESENT: ICD-10-CM

## 2023-08-01 DIAGNOSIS — M54.9 BACK PAIN, UNSPECIFIED BACK LOCATION, UNSPECIFIED BACK PAIN LATERALITY, UNSPECIFIED CHRONICITY: ICD-10-CM

## 2023-08-01 DIAGNOSIS — E78.5 HYPERLIPIDEMIA, UNSPECIFIED HYPERLIPIDEMIA TYPE: ICD-10-CM

## 2023-08-01 DIAGNOSIS — F41.9 ANXIETY: ICD-10-CM

## 2023-08-01 PROCEDURE — 99213 OFFICE O/P EST LOW 20 MIN: CPT | Performed by: FAMILY MEDICINE

## 2023-08-01 RX ORDER — DICLOFENAC SODIUM 75 MG/1
1 TABLET, DELAYED RELEASE ORAL 2 TIMES DAILY
COMMUNITY
Start: 2021-09-27 | End: 2023-08-01 | Stop reason: SDUPTHER

## 2023-08-01 RX ORDER — ESCITALOPRAM OXALATE 20 MG/1
20 TABLET ORAL DAILY
Qty: 90 TABLET | Refills: 1 | Status: SHIPPED | OUTPATIENT
Start: 2023-08-01

## 2023-08-01 RX ORDER — HYDROCODONE BITARTRATE AND ACETAMINOPHEN 5; 325 MG/1; MG/1
1 TABLET ORAL EVERY 8 HOURS PRN
Qty: 20 TABLET | Refills: 0 | Status: SHIPPED | OUTPATIENT
Start: 2023-08-01 | End: 2023-08-08

## 2023-08-01 RX ORDER — CYCLOBENZAPRINE HCL 5 MG
5 TABLET ORAL 3 TIMES DAILY PRN
Qty: 30 TABLET | Refills: 2 | Status: SHIPPED | OUTPATIENT
Start: 2023-08-01 | End: 2024-03-28

## 2023-08-01 RX ORDER — ATORVASTATIN CALCIUM 40 MG/1
1 TABLET, FILM COATED ORAL NIGHTLY
COMMUNITY
Start: 2020-08-25 | End: 2023-08-01 | Stop reason: SDUPTHER

## 2023-08-01 RX ORDER — ESCITALOPRAM OXALATE 20 MG/1
20 TABLET ORAL DAILY
COMMUNITY
End: 2023-08-01 | Stop reason: SDUPTHER

## 2023-08-01 RX ORDER — PREDNISONE 10 MG/1
TABLET ORAL
Qty: 51 TABLET | Refills: 0 | Status: SHIPPED | OUTPATIENT
Start: 2023-08-01

## 2023-08-01 RX ORDER — DICLOFENAC SODIUM 75 MG/1
75 TABLET, DELAYED RELEASE ORAL 2 TIMES DAILY
Qty: 90 TABLET | Refills: 1 | Status: SHIPPED | OUTPATIENT
Start: 2023-08-01

## 2023-08-01 RX ORDER — ATORVASTATIN CALCIUM 40 MG/1
40 TABLET, FILM COATED ORAL NIGHTLY
Qty: 90 TABLET | Refills: 1 | Status: SHIPPED | OUTPATIENT
Start: 2023-08-01

## 2023-08-01 ASSESSMENT — PATIENT HEALTH QUESTIONNAIRE - PHQ9
1. LITTLE INTEREST OR PLEASURE IN DOING THINGS: NOT AT ALL
SUM OF ALL RESPONSES TO PHQ9 QUESTIONS 1 AND 2: 0
2. FEELING DOWN, DEPRESSED OR HOPELESS: NOT AT ALL

## 2023-08-01 NOTE — PROGRESS NOTES
Subjective   Patient ID: Pelon Blair is a 57 y.o. male who presents for Sciatica.    HPI  Pt reports RIGHT sided sciatic pain  Ongoing x4 day flare up  Pt was weeding outside and aggravated area  Pt has tried: Ibuprofen w/o relief  Pt has taken Prednisone burst taper tat has worked well in the past    Yearly BW ordered    Review of Systems  Constitutional:  no chills, no fever and no night sweats.  Eyes: no blurred vision and no eyesight problems.  ENT: no hearing loss, no nasal congestion, no hoarseness and no sore throat.  Neck: no mass (es) and no swelling.  Cardiovascular: no chest pain, no intermittent leg claudication, no lower extremity edema, no palpitation and no syncope.  Respiratory: no cough, no shortness of breath during exertion, no shortness of breath at rest and no wheezing.  Gastrointestinal: no abdominal pain, no blood in stools, no constipation, no diarrhea, no melena, no nausea, no rectal pain and no vomiting.  Genitourinary: no dysuria, no change in urinary frequency, no urinary hesitancy and no feelings of urinary urgency.  Musculoskeletal: no arthralgias, no back pain and no myalgias.  Integumentary: no new skin lesions and no rashes.  Neurological: no difficulty walking, no headache, no limb weakness, no numbness and no tingling.  Psychiatric/Behavioral: no anxiety, no depression, no anhedonia and no substance use disorders.  Endocrine: no recent weight gain and no recent weight loss.  Hematologic/Lymphatic: no tendency for easy bruising and no swollen glands    Objective   Physical Exam  Patient in today with complaints of low back pain flareup was right-sided sciatica was bending over pulling weeds and doing yard work 4 hours yesterday afternoon.  Pain and tenderness in the right SI joint right sciatic notch palpation here reproduces the pain down the posterior thigh into the calf.  Full range of motion of the back.  No trauma.  /70   Pulse 67   Temp 37 °C (98.6 °F)   Resp 18    Wt 93.2 kg (205 lb 6.4 oz)   SpO2 98%   BMI 26.37 kg/m²     Lab Results   Component Value Date    WBC 6.5 08/22/2022    HGB 16.1 08/22/2022    HCT 47.6 08/22/2022    MCV 94 08/22/2022     08/22/2022       Assessment/Plan exacerbation of intermittent chronic sciatica no trauma plan burst and taper prednisone Flexeril twice daily to 3 times daily and hydrocodone every 8 hours as necessary for pain.  he will do warm water soaks gentle stretching.  If not improving or new symptoms develop let us know he is feeling better activity as tolerated.  Problem List Items Addressed This Visit       Anxiety    Back pain    BPH (benign prostatic hyperplasia)    Hyperlipidemia     Other Visit Diagnoses       Screen for colon cancer

## 2023-08-01 NOTE — LETTER
August 1, 2023     Patient: Pelon Blair   YOB: 1965   Date of Visit: 8/1/2023       To Whom It May Concern:    Pelon Blair was seen in my clinic on 8/1/2023 at ?. Please excuse Pelon for his absence from work from 7/31/2023 and 8/1/2023. He can return on 8/2/2023.    If you have any questions or concerns, please don't hesitate to call.         Sincerely,         Shailesh De Oliveira MD        CC: No Recipients

## 2023-08-09 ENCOUNTER — NURSE ONLY (OUTPATIENT)
Dept: ENDOCRINOLOGY | Age: 58
End: 2023-08-09

## 2023-08-09 DIAGNOSIS — E29.1 HYPOGONADISM MALE: Primary | ICD-10-CM

## 2023-08-09 RX ORDER — TESTOSTERONE CYPIONATE 200 MG/ML
160 INJECTION, SOLUTION INTRAMUSCULAR ONCE
Status: COMPLETED | OUTPATIENT
Start: 2023-08-09 | End: 2023-08-09

## 2023-08-09 RX ADMIN — TESTOSTERONE CYPIONATE 160 MG: 200 INJECTION, SOLUTION INTRAMUSCULAR at 15:04

## 2023-08-09 NOTE — PROGRESS NOTES
Patient given Testosterone 160mg IM right gluteal..    Patient tolerated injection well.     Administrations This Visit       testosterone cypionate (DEPOTESTOTERONE CYPIONATE) injection 160 mg       Admin Date  08/09/2023 Action  Given Dose  160 mg Route  [facility] Administered By  Socorro Charlton LPN

## 2023-08-23 ENCOUNTER — NURSE ONLY (OUTPATIENT)
Dept: ENDOCRINOLOGY | Age: 58
End: 2023-08-23
Payer: COMMERCIAL

## 2023-08-23 DIAGNOSIS — E29.1 HYPOGONADISM MALE: Primary | ICD-10-CM

## 2023-08-23 PROCEDURE — 96372 THER/PROPH/DIAG INJ SC/IM: CPT | Performed by: INTERNAL MEDICINE

## 2023-08-23 RX ORDER — TESTOSTERONE CYPIONATE 200 MG/ML
160 INJECTION, SOLUTION INTRAMUSCULAR ONCE
Status: COMPLETED | OUTPATIENT
Start: 2023-08-23 | End: 2023-08-23

## 2023-08-23 RX ADMIN — TESTOSTERONE CYPIONATE 160 MG: 200 INJECTION, SOLUTION INTRAMUSCULAR at 14:56

## 2023-08-23 NOTE — PROGRESS NOTES
Patient given Testosterone 160mg IM left gluteal..  Will return in 2 weeks for next injection      Patient tolerated injection well.     Administrations This Visit       testosterone cypionate (DEPOTESTOTERONE CYPIONATE) injection 160 mg       Admin Date  08/23/2023 Action  Given Dose  160 mg Route  [facility] Administered By  Jasson Paredes LPN

## 2023-09-20 ENCOUNTER — NURSE ONLY (OUTPATIENT)
Dept: ENDOCRINOLOGY | Age: 58
End: 2023-09-20

## 2023-09-20 DIAGNOSIS — E29.1 HYPOGONADISM MALE: Primary | ICD-10-CM

## 2023-09-20 RX ORDER — TESTOSTERONE CYPIONATE 200 MG/ML
160 INJECTION, SOLUTION INTRAMUSCULAR ONCE
Status: COMPLETED | OUTPATIENT
Start: 2023-09-20 | End: 2023-09-20

## 2023-09-20 RX ADMIN — TESTOSTERONE CYPIONATE 160 MG: 200 INJECTION, SOLUTION INTRAMUSCULAR at 15:13

## 2023-09-20 NOTE — PROGRESS NOTES
Patient given Testosterone 160mg IM right gluteal..  Will return in 2 weeks for next injection    Patient tolerated injection well.     Administrations This Visit       testosterone cypionate (DEPOTESTOTERONE CYPIONATE) injection 160 mg       Admin Date  09/20/2023 Action  Given Dose  160 mg Route  [facility] Administered By  Yuki Khalil LPN

## 2023-09-27 DIAGNOSIS — E29.1 HYPOGONADISM MALE: ICD-10-CM

## 2023-09-27 DIAGNOSIS — E29.1 HYPOGONADISM MALE: Primary | ICD-10-CM

## 2023-09-27 DIAGNOSIS — E29.1 HYPOGONADISM IN MALE: ICD-10-CM

## 2023-09-27 LAB
ERYTHROCYTE [DISTWIDTH] IN BLOOD BY AUTOMATED COUNT: 12.4 % (ref 11.5–14.5)
HCT VFR BLD AUTO: 46.9 % (ref 42–52)
HGB BLD-MCNC: 15.9 G/DL (ref 14–18)
MCH RBC QN AUTO: 30.8 PG (ref 27–31.3)
MCHC RBC AUTO-ENTMCNC: 33.9 % (ref 33–37)
MCV RBC AUTO: 90.7 FL (ref 79–92.2)
PLATELET # BLD AUTO: 205 K/UL (ref 130–400)
PSA SERPL-MCNC: 0.38 NG/ML (ref 0–4)
RBC # BLD AUTO: 5.17 M/UL (ref 4.7–6.1)
WBC # BLD AUTO: 6.1 K/UL (ref 4.8–10.8)

## 2023-09-27 PROCEDURE — 36415 COLL VENOUS BLD VENIPUNCTURE: CPT | Performed by: INTERNAL MEDICINE

## 2023-09-28 LAB
SHBG SERPL-SCNC: 27 NMOL/L (ref 11–80)
TESTOST FREE SERPL-MCNC: 202.9 PG/ML (ref 47–244)
TESTOST SERPL-MCNC: 797 NG/DL (ref 220–1000)

## 2023-10-04 ENCOUNTER — NURSE ONLY (OUTPATIENT)
Dept: ENDOCRINOLOGY | Age: 58
End: 2023-10-04
Payer: COMMERCIAL

## 2023-10-04 DIAGNOSIS — E29.1 HYPOGONADISM MALE: Primary | ICD-10-CM

## 2023-10-04 PROCEDURE — 96372 THER/PROPH/DIAG INJ SC/IM: CPT | Performed by: INTERNAL MEDICINE

## 2023-10-04 RX ORDER — TESTOSTERONE CYPIONATE 200 MG/ML
160 INJECTION, SOLUTION INTRAMUSCULAR ONCE
Status: COMPLETED | OUTPATIENT
Start: 2023-10-04 | End: 2023-10-04

## 2023-10-04 RX ADMIN — TESTOSTERONE CYPIONATE 160 MG: 200 INJECTION, SOLUTION INTRAMUSCULAR at 14:57

## 2023-10-04 NOTE — PROGRESS NOTES
Patient given Testosterone 160mg IM left gluteal..  Will return in 2 weeks for next injection    Patient tolerated injection well.     Administrations This Visit       testosterone cypionate (DEPOTESTOTERONE CYPIONATE) injection 160 mg       Admin Date  10/04/2023 Action  Given Dose  160 mg Route  [facility] Administered By  Brooke Sanchez LPN

## 2023-10-18 NOTE — PROGRESS NOTES
Subjective   Patient ID: Pelon Blair is a 57 y.o. male who presents for Sinusitis.    HPI  Pt reports above conditions  Ongoing x1 week  Other symptoms:  RIGHT sinus is worse  Headache  Nosebleeds  Stuffy nose  Pt has tried:  Tylenol sinus   Sudafed  With minimal relief    No other concerns today    Yearly BW ordered 8/1/2023/ Pt reminded to do  Colon screen ordered 8/1/2023/ Pt reminded to do      Review of Systems  Constitutional:  no chills, no fever and no night sweats.  Eyes: no blurred vision and no eyesight problems.  ENT: no hearing loss, no nasal congestion, no hoarseness and no sore throat.  Neck: no mass (es) and no swelling.  Cardiovascular: no chest pain, no intermittent leg claudication, no lower extremity edema, no palpitation and no syncope.  Respiratory: no cough, no shortness of breath during exertion, no shortness of breath at rest and no wheezing.  Gastrointestinal: no abdominal pain, no blood in stools, no constipation, no diarrhea, no melena, no nausea, no rectal pain and no vomiting.  Genitourinary: no dysuria, no change in urinary frequency, no urinary hesitancy and no feelings of urinary urgency.  Musculoskeletal: no arthralgias, no back pain and no myalgias.  Integumentary: no new skin lesions and no rashes.  Neurological: no difficulty walking, no headache, no limb weakness, no numbness and no tingling.  Psychiatric/Behavioral: no anxiety, no depression, no anhedonia and no substance use disorders.  Endocrine: no recent weight gain and no recent weight loss.  Hematologic/Lymphatic: no tendency for easy bruising and no swollen glands    Objective   Physical Exam  Patient with complaints of frontal headache sinus pressure congestion occasionally productive cough he has pain and tenderness over the frontal sinuses nasal mucosa is erythematous swollen with yellowish postnasal drainage adenopathy without exudate neck is supple lungs are clear cardiac and abdominal exams are benign.  BP  142/68   Pulse 78   Temp 37 °C (98.6 °F)   Resp 20   Wt 94.2 kg (207 lb 9.6 oz)   SpO2 98%   BMI 26.65 kg/m²     Lab Results   Component Value Date    WBC 6.5 08/22/2022    HGB 16.1 08/22/2022    HCT 47.6 08/22/2022    MCV 94 08/22/2022     08/22/2022       Assessment/Plan assessment sinusitis pharyngitis plan Z-Migue and follow-up if not improved  Problem List Items Addressed This Visit    None

## 2023-10-19 ENCOUNTER — TELEPHONE (OUTPATIENT)
Dept: PRIMARY CARE | Facility: CLINIC | Age: 58
End: 2023-10-19

## 2023-10-19 ENCOUNTER — OFFICE VISIT (OUTPATIENT)
Dept: PRIMARY CARE | Facility: CLINIC | Age: 58
End: 2023-10-19
Payer: COMMERCIAL

## 2023-10-19 VITALS
DIASTOLIC BLOOD PRESSURE: 68 MMHG | TEMPERATURE: 98.6 F | BODY MASS INDEX: 26.65 KG/M2 | RESPIRATION RATE: 20 BRPM | SYSTOLIC BLOOD PRESSURE: 142 MMHG | OXYGEN SATURATION: 98 % | WEIGHT: 207.6 LBS | HEART RATE: 78 BPM

## 2023-10-19 DIAGNOSIS — J01.10 ACUTE NON-RECURRENT FRONTAL SINUSITIS: Primary | ICD-10-CM

## 2023-10-19 PROCEDURE — 99213 OFFICE O/P EST LOW 20 MIN: CPT | Performed by: FAMILY MEDICINE

## 2023-10-19 RX ORDER — AZITHROMYCIN 250 MG/1
TABLET, FILM COATED ORAL
Qty: 6 TABLET | Refills: 2 | Status: SHIPPED | OUTPATIENT
Start: 2023-10-19 | End: 2023-10-24

## 2023-10-19 NOTE — LETTER
October 19, 2023     Patient: Pelon Blair   YOB: 1965   Date of Visit: 10/19/2023       To Whom It May Concern:    Pelon Blair was seen in my clinic on 10/19/2023 for an appointment.  Please excuse from work 10/18/23 and 10/19/23.  May return to work on 10/20/23    If you have any questions or concerns, please don't hesitate to call.    Sincerely,         Shailesh De Oliveira MD

## 2023-10-25 ENCOUNTER — NURSE ONLY (OUTPATIENT)
Dept: ENDOCRINOLOGY | Age: 58
End: 2023-10-25
Payer: COMMERCIAL

## 2023-10-25 DIAGNOSIS — E29.1 HYPOGONADISM MALE: Primary | ICD-10-CM

## 2023-10-25 PROCEDURE — 96372 THER/PROPH/DIAG INJ SC/IM: CPT | Performed by: PHYSICIAN ASSISTANT

## 2023-10-25 RX ORDER — TESTOSTERONE CYPIONATE 200 MG/ML
160 INJECTION, SOLUTION INTRAMUSCULAR ONCE
Status: COMPLETED | OUTPATIENT
Start: 2023-10-25 | End: 2023-10-25

## 2023-10-25 RX ADMIN — TESTOSTERONE CYPIONATE 160 MG: 200 INJECTION, SOLUTION INTRAMUSCULAR at 14:01

## 2023-10-25 NOTE — PROGRESS NOTES
Patient given Testosterone 160mg IM right gluteal..  Will return in 2 weeks for next injection    Patient tolerated injection well.     Administrations This Visit       testosterone cypionate (DEPOTESTOTERONE CYPIONATE) injection 160 mg       Admin Date  10/25/2023 Action  Given Dose  160 mg Route  [facility] Administered By  Julio Cesar Lozano LPN

## 2023-11-08 ENCOUNTER — NURSE ONLY (OUTPATIENT)
Dept: ENDOCRINOLOGY | Age: 58
End: 2023-11-08
Payer: COMMERCIAL

## 2023-11-08 DIAGNOSIS — E29.1 HYPOGONADISM MALE: Primary | ICD-10-CM

## 2023-11-08 PROCEDURE — 96372 THER/PROPH/DIAG INJ SC/IM: CPT | Performed by: INTERNAL MEDICINE

## 2023-11-08 RX ORDER — TESTOSTERONE CYPIONATE 200 MG/ML
160 INJECTION, SOLUTION INTRAMUSCULAR ONCE
Status: COMPLETED | OUTPATIENT
Start: 2023-11-08 | End: 2023-11-08

## 2023-11-08 RX ADMIN — TESTOSTERONE CYPIONATE 160 MG: 200 INJECTION, SOLUTION INTRAMUSCULAR at 13:59

## 2023-11-08 NOTE — PROGRESS NOTES
Patient given Testosterone 160mg IM left gluteal..  Will return in 2 weeks for next injection    Patient tolerated injection well.     Administrations This Visit       testosterone cypionate (DEPOTESTOTERONE CYPIONATE) injection 160 mg       Admin Date  11/08/2023 Action  Given Dose  160 mg Route  [facility] Administered By  Guerrero Grace LPN

## 2023-11-22 ENCOUNTER — NURSE ONLY (OUTPATIENT)
Dept: ENDOCRINOLOGY | Age: 58
End: 2023-11-22
Payer: COMMERCIAL

## 2023-11-22 DIAGNOSIS — E29.1 HYPOGONADISM MALE: Primary | ICD-10-CM

## 2023-11-22 PROCEDURE — 96372 THER/PROPH/DIAG INJ SC/IM: CPT | Performed by: PHYSICIAN ASSISTANT

## 2023-11-22 RX ORDER — TESTOSTERONE CYPIONATE 200 MG/ML
160 INJECTION, SOLUTION INTRAMUSCULAR ONCE
Status: COMPLETED | OUTPATIENT
Start: 2023-11-22 | End: 2023-11-22

## 2023-11-22 RX ADMIN — TESTOSTERONE CYPIONATE 160 MG: 200 INJECTION, SOLUTION INTRAMUSCULAR at 14:01

## 2023-11-22 NOTE — PROGRESS NOTES
Patient given Testosterone 160mg IM right gluteal..  Will return in 2 weeks for next injection    Patient tolerated injection well.     Administrations This Visit       testosterone cypionate (DEPOTESTOTERONE CYPIONATE) injection 160 mg       Admin Date  11/22/2023 Action  Given Dose  160 mg Route  [facility] Administered By  Brooke Sanchez LPN

## 2023-12-06 ENCOUNTER — NURSE ONLY (OUTPATIENT)
Dept: ENDOCRINOLOGY | Age: 58
End: 2023-12-06
Payer: COMMERCIAL

## 2023-12-06 DIAGNOSIS — E29.1 HYPOGONADISM MALE: Primary | ICD-10-CM

## 2023-12-06 PROCEDURE — 96372 THER/PROPH/DIAG INJ SC/IM: CPT | Performed by: INTERNAL MEDICINE

## 2023-12-06 RX ORDER — TESTOSTERONE CYPIONATE 200 MG/ML
160 INJECTION, SOLUTION INTRAMUSCULAR ONCE
Status: COMPLETED | OUTPATIENT
Start: 2023-12-06 | End: 2023-12-06

## 2023-12-06 RX ADMIN — TESTOSTERONE CYPIONATE 160 MG: 200 INJECTION, SOLUTION INTRAMUSCULAR at 14:30

## 2023-12-06 NOTE — PROGRESS NOTES
Patient given Testosterone 160mg IM left gluteal..  Will return in 2 weeks for next injection    Patient tolerated injection well.     Administrations This Visit       testosterone cypionate (DEPOTESTOTERONE CYPIONATE) injection 160 mg       Admin Date  12/06/2023 Action  Given Dose  160 mg Route  [facility] Administered By  Julio Cesar Lozano LPN

## 2024-01-03 ENCOUNTER — NURSE ONLY (OUTPATIENT)
Dept: ENDOCRINOLOGY | Age: 59
End: 2024-01-03
Payer: COMMERCIAL

## 2024-01-03 DIAGNOSIS — E29.1 HYPOGONADISM MALE: Primary | ICD-10-CM

## 2024-01-03 PROCEDURE — 96372 THER/PROPH/DIAG INJ SC/IM: CPT | Performed by: INTERNAL MEDICINE

## 2024-01-03 RX ORDER — TESTOSTERONE CYPIONATE 200 MG/ML
160 INJECTION, SOLUTION INTRAMUSCULAR ONCE
Status: COMPLETED | OUTPATIENT
Start: 2024-01-03 | End: 2024-01-03

## 2024-01-03 RX ADMIN — TESTOSTERONE CYPIONATE 160 MG: 200 INJECTION, SOLUTION INTRAMUSCULAR at 13:59

## 2024-01-03 NOTE — PROGRESS NOTES
Patient given Testosterone 160mg IM left gluteal..  Will return in 2 weeks for next injection    Patient tolerated injection well.    Administrations This Visit       testosterone cypionate (DEPOTESTOTERONE CYPIONATE) injection 160 mg       Admin Date  01/03/2024 Action  Given Dose  160 mg Route  [facility] Administered By  Rocio House LPN

## 2024-01-17 ENCOUNTER — NURSE ONLY (OUTPATIENT)
Dept: ENDOCRINOLOGY | Age: 59
End: 2024-01-17
Payer: COMMERCIAL

## 2024-01-17 DIAGNOSIS — E29.1 HYPOGONADISM MALE: Primary | ICD-10-CM

## 2024-01-17 PROCEDURE — 96372 THER/PROPH/DIAG INJ SC/IM: CPT | Performed by: INTERNAL MEDICINE

## 2024-01-17 RX ORDER — TESTOSTERONE CYPIONATE 200 MG/ML
160 INJECTION, SOLUTION INTRAMUSCULAR ONCE
Status: COMPLETED | OUTPATIENT
Start: 2024-01-17 | End: 2024-01-17

## 2024-01-17 RX ADMIN — TESTOSTERONE CYPIONATE 160 MG: 200 INJECTION, SOLUTION INTRAMUSCULAR at 13:49

## 2024-01-17 NOTE — PROGRESS NOTES
Patient given Testosterone 160 mg IM right gluteal..  Will return in 2 weeks for next injection    Patient tolerated injection well.    Administrations This Visit       testosterone cypionate (DEPOTESTOTERONE CYPIONATE) injection 160 mg       Admin Date  01/17/2024 Action  Given Dose  160 mg Route  [facility] Administered By  Rocio House LPN

## 2024-01-31 ENCOUNTER — NURSE ONLY (OUTPATIENT)
Dept: ENDOCRINOLOGY | Age: 59
End: 2024-01-31
Payer: COMMERCIAL

## 2024-01-31 DIAGNOSIS — E29.1 HYPOGONADISM MALE: Primary | ICD-10-CM

## 2024-01-31 PROCEDURE — 96372 THER/PROPH/DIAG INJ SC/IM: CPT | Performed by: INTERNAL MEDICINE

## 2024-01-31 RX ORDER — TESTOSTERONE CYPIONATE 200 MG/ML
160 INJECTION, SOLUTION INTRAMUSCULAR ONCE
Status: COMPLETED | OUTPATIENT
Start: 2024-01-31 | End: 2024-01-31

## 2024-01-31 RX ADMIN — TESTOSTERONE CYPIONATE 160 MG: 200 INJECTION, SOLUTION INTRAMUSCULAR at 14:43

## 2024-01-31 NOTE — PROGRESS NOTES
Patient given Testosterone 160mg IM left gluteal..  Will return in 2 weeks for next injection    Patient tolerated injection well.    Administrations This Visit       testosterone cypionate (DEPOTESTOTERONE CYPIONATE) injection 160 mg       Admin Date  01/31/2024 Action  Given Dose  160 mg Route  [facility] Administered By  Rocio House LPN

## 2024-02-28 ENCOUNTER — NURSE ONLY (OUTPATIENT)
Dept: ENDOCRINOLOGY | Age: 59
End: 2024-02-28
Payer: COMMERCIAL

## 2024-02-28 DIAGNOSIS — E29.1 HYPOGONADISM MALE: Primary | ICD-10-CM

## 2024-02-28 PROCEDURE — 96372 THER/PROPH/DIAG INJ SC/IM: CPT | Performed by: INTERNAL MEDICINE

## 2024-02-28 RX ORDER — TESTOSTERONE CYPIONATE 200 MG/ML
160 INJECTION, SOLUTION INTRAMUSCULAR ONCE
Status: COMPLETED | OUTPATIENT
Start: 2024-02-28 | End: 2024-02-28

## 2024-02-28 RX ADMIN — TESTOSTERONE CYPIONATE 160 MG: 200 INJECTION, SOLUTION INTRAMUSCULAR at 15:07

## 2024-02-28 NOTE — PROGRESS NOTES
Patient given Testosterone 160mg IM right gluteal..  Will return in 2 weeks for next injection    Patient tolerated injection well.    Administrations This Visit       testosterone cypionate (DEPOTESTOTERONE CYPIONATE) injection 160 mg       Admin Date  02/28/2024 Action  Given Dose  160 mg Route  [facility] Administered By  Rocio House LPN

## 2024-03-11 ENCOUNTER — NURSE ONLY (OUTPATIENT)
Dept: FAMILY MEDICINE CLINIC | Age: 59
End: 2024-03-11
Payer: COMMERCIAL

## 2024-03-11 DIAGNOSIS — E29.1 HYPOGONADISM MALE: Primary | ICD-10-CM

## 2024-03-11 PROCEDURE — 96372 THER/PROPH/DIAG INJ SC/IM: CPT | Performed by: FAMILY MEDICINE

## 2024-03-11 RX ORDER — TESTOSTERONE CYPIONATE 200 MG/ML
160 INJECTION, SOLUTION INTRAMUSCULAR ONCE
Status: COMPLETED | OUTPATIENT
Start: 2024-03-11 | End: 2024-03-11

## 2024-03-11 RX ADMIN — TESTOSTERONE CYPIONATE 160 MG: 200 INJECTION, SOLUTION INTRAMUSCULAR at 13:29

## 2024-03-11 NOTE — PROGRESS NOTES
Patient given Testosterone 160mg IM right gluteal..  Will return in 2 weeks for next injection    Patient tolerated injection well.    Administrations This Visit       testosterone cypionate (DEPOTESTOTERONE CYPIONATE) injection 160 mg       Admin Date  03/11/2024 Action  Given Dose  160 mg Route  [facility] Administered By  Rocio House LPN

## 2024-03-25 ENCOUNTER — NURSE ONLY (OUTPATIENT)
Dept: FAMILY MEDICINE CLINIC | Age: 59
End: 2024-03-25
Payer: COMMERCIAL

## 2024-03-25 DIAGNOSIS — E29.1 HYPOGONADISM MALE: Primary | ICD-10-CM

## 2024-03-25 PROCEDURE — 96372 THER/PROPH/DIAG INJ SC/IM: CPT | Performed by: FAMILY MEDICINE

## 2024-03-25 RX ORDER — TESTOSTERONE CYPIONATE 200 MG/ML
160 INJECTION, SOLUTION INTRAMUSCULAR ONCE
Status: COMPLETED | OUTPATIENT
Start: 2024-03-25 | End: 2024-03-25

## 2024-03-25 RX ADMIN — TESTOSTERONE CYPIONATE 160 MG: 200 INJECTION, SOLUTION INTRAMUSCULAR at 13:38

## 2024-03-25 NOTE — PROGRESS NOTES
Patient given Testosterone 160mg IM left gluteal..    Patient tolerated injection well.    Administrations This Visit       testosterone cypionate (DEPOTESTOTERONE CYPIONATE) injection 160 mg       Admin Date  03/25/2024 Action  Given Dose  160 mg Route  [facility] Administered By  Rocio House LPN                    None

## 2024-04-15 ENCOUNTER — NURSE ONLY (OUTPATIENT)
Dept: FAMILY MEDICINE CLINIC | Age: 59
End: 2024-04-15
Payer: COMMERCIAL

## 2024-04-15 DIAGNOSIS — E29.1 HYPOGONADISM MALE: Primary | ICD-10-CM

## 2024-04-15 PROCEDURE — 96372 THER/PROPH/DIAG INJ SC/IM: CPT | Performed by: FAMILY MEDICINE

## 2024-04-15 RX ORDER — TESTOSTERONE CYPIONATE 200 MG/ML
160 INJECTION, SOLUTION INTRAMUSCULAR ONCE
Status: COMPLETED | OUTPATIENT
Start: 2024-04-15 | End: 2024-04-15

## 2024-04-15 RX ADMIN — TESTOSTERONE CYPIONATE 160 MG: 200 INJECTION, SOLUTION INTRAMUSCULAR at 15:16

## 2024-04-15 NOTE — PROGRESS NOTES
Patient given Testosterone 160mg IM right gluteal..  Will return in 2 weeks for next injection    Patient tolerated injection well.    Administrations This Visit       testosterone cypionate (DEPOTESTOTERONE CYPIONATE) injection 160 mg       Admin Date  04/15/2024 Action  Given Dose  160 mg Route  [facility] Administered By  Rocio House LPN

## 2024-05-01 ENCOUNTER — NURSE ONLY (OUTPATIENT)
Dept: ENDOCRINOLOGY | Age: 59
End: 2024-05-01
Payer: COMMERCIAL

## 2024-05-01 DIAGNOSIS — E29.1 HYPOGONADISM MALE: Primary | ICD-10-CM

## 2024-05-01 PROCEDURE — 96372 THER/PROPH/DIAG INJ SC/IM: CPT | Performed by: INTERNAL MEDICINE

## 2024-05-01 RX ORDER — TESTOSTERONE CYPIONATE 200 MG/ML
160 INJECTION, SOLUTION INTRAMUSCULAR ONCE
Status: COMPLETED | OUTPATIENT
Start: 2024-05-01 | End: 2024-05-01

## 2024-05-01 RX ADMIN — TESTOSTERONE CYPIONATE 160 MG: 200 INJECTION, SOLUTION INTRAMUSCULAR at 14:23

## 2024-05-01 NOTE — PROGRESS NOTES
Patient given Testosterone 160mg IM left gluteal..  Will return in 2 weeks for next injection    Patient tolerated injection well.    Administrations This Visit       testosterone cypionate (DEPOTESTOTERONE CYPIONATE) injection 160 mg       Admin Date  05/01/2024 Action  Given Dose  160 mg Route  [facility] Administered By  Rocio House LPN

## 2024-05-15 ENCOUNTER — NURSE ONLY (OUTPATIENT)
Dept: ENDOCRINOLOGY | Age: 59
End: 2024-05-15
Payer: COMMERCIAL

## 2024-05-15 DIAGNOSIS — E29.1 HYPOGONADISM MALE: Primary | ICD-10-CM

## 2024-05-15 PROCEDURE — 96372 THER/PROPH/DIAG INJ SC/IM: CPT | Performed by: INTERNAL MEDICINE

## 2024-05-15 RX ORDER — TESTOSTERONE CYPIONATE 200 MG/ML
160 INJECTION, SOLUTION INTRAMUSCULAR ONCE
Status: COMPLETED | OUTPATIENT
Start: 2024-05-15 | End: 2024-05-15

## 2024-05-15 RX ADMIN — TESTOSTERONE CYPIONATE 160 MG: 200 INJECTION, SOLUTION INTRAMUSCULAR at 14:25

## 2024-05-15 NOTE — PROGRESS NOTES
Patient given Testosterone 160mg IM right gluteal..  Will return in 2 weeks for next injection    Patient tolerated injection well.    Administrations This Visit       testosterone cypionate (DEPOTESTOTERONE CYPIONATE) injection 160 mg       Admin Date  05/15/2024 Action  Given Dose  160 mg Route  [facility] Administered By  Rocio House LPN

## 2024-05-18 ENCOUNTER — OFFICE VISIT (OUTPATIENT)
Dept: PRIMARY CARE | Facility: CLINIC | Age: 59
End: 2024-05-18
Payer: COMMERCIAL

## 2024-05-18 VITALS
SYSTOLIC BLOOD PRESSURE: 130 MMHG | TEMPERATURE: 98 F | WEIGHT: 206.4 LBS | HEIGHT: 74 IN | DIASTOLIC BLOOD PRESSURE: 78 MMHG | HEART RATE: 72 BPM | RESPIRATION RATE: 16 BRPM | BODY MASS INDEX: 26.49 KG/M2 | OXYGEN SATURATION: 97 %

## 2024-05-18 DIAGNOSIS — J00 NASOPHARYNGITIS: ICD-10-CM

## 2024-05-18 DIAGNOSIS — J34.89 NASAL CONGESTION WITH RHINORRHEA: ICD-10-CM

## 2024-05-18 DIAGNOSIS — J01.10 ACUTE NON-RECURRENT FRONTAL SINUSITIS: Primary | ICD-10-CM

## 2024-05-18 DIAGNOSIS — R09.81 NASAL CONGESTION WITH RHINORRHEA: ICD-10-CM

## 2024-05-18 DIAGNOSIS — R05.9 COUGH IN ADULT PATIENT: ICD-10-CM

## 2024-05-18 PROCEDURE — 99212 OFFICE O/P EST SF 10 MIN: CPT | Performed by: NURSE PRACTITIONER

## 2024-05-18 RX ORDER — BROMPHENIRAMINE MALEATE, PSEUDOEPHEDRINE HYDROCHLORIDE, AND DEXTROMETHORPHAN HYDROBROMIDE 2; 30; 10 MG/5ML; MG/5ML; MG/5ML
5 SYRUP ORAL 4 TIMES DAILY PRN
Qty: 120 ML | Refills: 1 | Status: SHIPPED | OUTPATIENT
Start: 2024-05-18 | End: 2024-05-28

## 2024-05-18 RX ORDER — AMOXICILLIN 875 MG/1
875 TABLET, FILM COATED ORAL 2 TIMES DAILY
Qty: 20 TABLET | Refills: 0 | Status: SHIPPED | OUTPATIENT
Start: 2024-05-18 | End: 2024-05-28

## 2024-05-18 ASSESSMENT — PATIENT HEALTH QUESTIONNAIRE - PHQ9
1. LITTLE INTEREST OR PLEASURE IN DOING THINGS: NOT AT ALL
SUM OF ALL RESPONSES TO PHQ9 QUESTIONS 1 AND 2: 0
1. LITTLE INTEREST OR PLEASURE IN DOING THINGS: NOT AT ALL
2. FEELING DOWN, DEPRESSED OR HOPELESS: NOT AT ALL
SUM OF ALL RESPONSES TO PHQ9 QUESTIONS 1 AND 2: 0
2. FEELING DOWN, DEPRESSED OR HOPELESS: NOT AT ALL

## 2024-05-18 ASSESSMENT — ENCOUNTER SYMPTOMS
DEPRESSION: 0
LOSS OF SENSATION IN FEET: 0
OCCASIONAL FEELINGS OF UNSTEADINESS: 0

## 2024-05-18 NOTE — LETTER
May 18, 2024     Patient: Pelon Blair   YOB: 1965   Date of Visit: 5/18/2024       To Whom It May Concern:    Pelon Blair was seen in my clinic on 5/18/2024 at 1:30 pm. Please excuse Pelon for his absence from work on 05/18/24 - 05/19/24 and may return to work on 05/20/2024.     If you have any questions or concerns, please don't hesitate to call.         Sincerely,         JAMEY Cosme-CNP        CC: No Recipients

## 2024-05-18 NOTE — PROGRESS NOTES
"Subjective   Patient ID: Pelon Blair is a 58 y.o. male who presents for sinusitis      Symptoms:  cough, sinus pressure, headaches, congestion, post nasal drip, runny nose,   Length of symptoms: 5 days ago  OTC: tylenol sinus  with no help.  Related information:    HPI     Review of Systems    Objective   /89 Comment: auto  Pulse 72   Temp 36.7 °C (98 °F)   Resp 16   Ht 1.88 m (6' 2\")   Wt 93.6 kg (206 lb 6.4 oz)   SpO2 97%   BMI 26.50 kg/m²     Physical Exam    Assessment/Plan          "

## 2024-05-18 NOTE — PROGRESS NOTES
Subjective   Patient ID: Pelon Blair is a 58 y.o. male who is with a chief complaint of symptoms of respiratory tract infection.     HPI  Patient is a 58 y.o. male who CONSULTED AT [facility] today. Patient is with complaints of nasal congestion, nasal discharge, headache, frontal sinus pain, throat irritation, post nasal drip, mild cough, and mild fatigue. He denies having any muscle ache, loss of sense of taste, loss of sense of smell, diarrhea, chills nor fever. Patient states that present condition started about 5 days ago. Patient denies history of recent travel, exposure to person/people who tested positive for COVID 19, nor exposure to person/people with flu like symptoms. he denies shortness of breath, chest pain, palpitations, nor edema. he stated that he  tried OTC medications which afforded only slight relief of symptoms. he denies nausea, vomiting, abdominal pain, nor any other symptoms.    Patient states he had his COVID vaccine.  Patient states he had the flu shot for this season.    Review of Systems  General: no weight loss, generally healthy, (+) mild fatigue  Head: (+) headache, (+) frontal sinus pain, no vertigo, no injury  Eyes: no diplopia, no tearing, no pain,   Ears: no change in hearing, no tinnitus, no bleeding, no vertigo  Mouth:  no dental difficulties, no gingival bleeding, (+) throat irritation, no loss of sense of taste, (+) post nasal drip,   Nose: (+) congestion, (+) discharge, no bleeding, no obstruction, no loss of sense of smell  Neck: no stiffness, no pain, no tenderness, no masses, no bruit  Pulmonary: no dyspnea, no wheezing, no hemoptysis, (+) mild cough  Cardiovascular: no chest pain, no palpitations, no syncope, no orthopnea  Gastrointestinal: no change in appetite, no dysphagia, no abdominal pains, no diarrhea, no emesis, no melena  Genito Urinary: no dysuria, no urinary urgency, no nocturia, no incontinence, no change in nature of  urine  Musculoskeletal: no muscle ache, no joint pain, no limitation of range of motion, no paresthesia, no numbness  Constitutional: no fever, no chills, no night sweats    Objective   Physical Exam  General: ambulatory, in no acute distress  Head: normocephalic, no lesions, (+) frontal sinus tenderness  Eyes: pink palpebral conjunctiva, anicteric sclerae, PERRLA, EOM's full  Ears: clear external auditory canals, no ear discharge, no bleeding from the ears, tympanic membrane intact  Nose: (+) congested nasal mucosa, (+) yellow mucoid nasal discharge, no bleeding, no obstruction  Throat: (+) erythema, and (+) exudate on posterior pharyngeal wall, no lesion  Neck: supple, no masses, no bruits, no CLADP  Chest: symmetrical chest expansion, no lagging, no retractions, clear breath sounds, no rales, no wheezes    Assessment/Plan   Problem List Items Addressed This Visit    None  Visit Diagnoses         Codes    Acute non-recurrent frontal sinusitis    -  Primary J01.10    Relevant Medications    amoxicillin (Amoxil) 875 mg tablet    brompheniramine-pseudoeph-DM 2-30-10 mg/5 mL syrup    Cough in adult patient     R05.9    Relevant Medications    amoxicillin (Amoxil) 875 mg tablet    brompheniramine-pseudoeph-DM 2-30-10 mg/5 mL syrup    Nasal congestion with rhinorrhea     R09.81, J34.89    Relevant Medications    amoxicillin (Amoxil) 875 mg tablet    brompheniramine-pseudoeph-DM 2-30-10 mg/5 mL syrup    Nasopharyngitis     J00    Relevant Medications    amoxicillin (Amoxil) 875 mg tablet    brompheniramine-pseudoeph-DM 2-30-10 mg/5 mL syrup        DISCHARGE SUMMARY:   Patient was seen and examined. Diagnosis, treatment, treatment options, and possible complications of today's illness discussed and explained to patient. Patient to take medication/s associated with this visit. Patient may also take OTC analgesic/antipyretic if needed for pain/fever. Advised to increase oral fluid intake. Advised steam inhalation if needed  to relieve congestion. Advised warm saline gargle if needed to relieve throat discomfort. Advised Listerine antiseptic mouthwash gargle TID. Patient may use Cepacol oral spray as needed to relieve throat discomfort. Patient was advised to discard the old toothbrush and use a new toothbrush beginning on the third of antibiotics. Advised to come back if with worsening or persistent symptoms. Patient verbalized understanding of plan of care.    Patient to come back in 7 - 10 days if needed for worsening symptoms.           JAMEY Cosme-CNP 05/18/24 1:07 PM

## 2024-05-29 ENCOUNTER — NURSE ONLY (OUTPATIENT)
Dept: ENDOCRINOLOGY | Age: 59
End: 2024-05-29
Payer: COMMERCIAL

## 2024-05-29 DIAGNOSIS — E29.1 HYPOGONADISM MALE: Primary | ICD-10-CM

## 2024-05-29 PROCEDURE — 96372 THER/PROPH/DIAG INJ SC/IM: CPT | Performed by: INTERNAL MEDICINE

## 2024-05-29 RX ORDER — TESTOSTERONE CYPIONATE 200 MG/ML
160 INJECTION, SOLUTION INTRAMUSCULAR ONCE
Status: COMPLETED | OUTPATIENT
Start: 2024-05-29 | End: 2024-05-29

## 2024-05-29 RX ADMIN — TESTOSTERONE CYPIONATE 160 MG: 200 INJECTION, SOLUTION INTRAMUSCULAR at 14:27

## 2024-05-29 NOTE — PROGRESS NOTES
Patient given Testosterone 160mg IM right gluteal..  Will return in 2 weeks for next injection    Patient tolerated injection well.    Administrations This Visit       testosterone cypionate (DEPOTESTOTERONE CYPIONATE) injection 160 mg       Admin Date  05/29/2024 Action  Given Dose  160 mg Route  [facility] Administered By  Rocio House LPN

## 2024-06-12 ENCOUNTER — NURSE ONLY (OUTPATIENT)
Dept: ENDOCRINOLOGY | Age: 59
End: 2024-06-12
Payer: COMMERCIAL

## 2024-06-12 DIAGNOSIS — E29.1 HYPOGONADISM MALE: Primary | ICD-10-CM

## 2024-06-12 PROCEDURE — 96372 THER/PROPH/DIAG INJ SC/IM: CPT | Performed by: INTERNAL MEDICINE

## 2024-06-12 RX ORDER — TESTOSTERONE CYPIONATE 200 MG/ML
160 INJECTION, SOLUTION INTRAMUSCULAR ONCE
Status: COMPLETED | OUTPATIENT
Start: 2024-06-12 | End: 2024-06-12

## 2024-06-12 RX ADMIN — TESTOSTERONE CYPIONATE 160 MG: 200 INJECTION, SOLUTION INTRAMUSCULAR at 13:58

## 2024-06-12 NOTE — PROGRESS NOTES
Patient given Testosterone 160mg IM left gluteal..  Will return in 2 weeks for next injection    Patient tolerated injection well.    Administrations This Visit       testosterone cypionate (DEPOTESTOTERONE CYPIONATE) injection 160 mg       Admin Date  06/12/2024 Action  Given Dose  160 mg Route  [facility] Administered By  Rocio House LPN

## 2024-06-26 ENCOUNTER — NURSE ONLY (OUTPATIENT)
Dept: ENDOCRINOLOGY | Age: 59
End: 2024-06-26
Payer: COMMERCIAL

## 2024-06-26 DIAGNOSIS — E29.1 HYPOGONADISM MALE: Primary | ICD-10-CM

## 2024-06-26 PROCEDURE — 96372 THER/PROPH/DIAG INJ SC/IM: CPT | Performed by: PHYSICIAN ASSISTANT

## 2024-06-26 RX ORDER — TESTOSTERONE CYPIONATE 200 MG/ML
160 INJECTION, SOLUTION INTRAMUSCULAR ONCE
Status: COMPLETED | OUTPATIENT
Start: 2024-06-26 | End: 2024-06-26

## 2024-06-26 RX ADMIN — TESTOSTERONE CYPIONATE 160 MG: 200 INJECTION, SOLUTION INTRAMUSCULAR at 14:32

## 2024-06-26 NOTE — PROGRESS NOTES
Patient given Testosterone 160mg IM right gluteal..  Will return in 2 weeks for next injection    Patient tolerated injection well.    Administrations This Visit       testosterone cypionate (DEPOTESTOTERONE CYPIONATE) injection 160 mg       Admin Date  06/26/2024 Action  Given Dose  160 mg Route  [facility] Administered By  Rocio House LPN

## 2024-07-11 ENCOUNTER — OFFICE VISIT (OUTPATIENT)
Dept: PRIMARY CARE | Facility: CLINIC | Age: 59
End: 2024-07-11
Payer: COMMERCIAL

## 2024-07-11 VITALS
SYSTOLIC BLOOD PRESSURE: 159 MMHG | OXYGEN SATURATION: 98 % | DIASTOLIC BLOOD PRESSURE: 91 MMHG | RESPIRATION RATE: 16 BRPM | BODY MASS INDEX: 26.18 KG/M2 | TEMPERATURE: 97.7 F | HEART RATE: 86 BPM | WEIGHT: 204 LBS | HEIGHT: 74 IN

## 2024-07-11 DIAGNOSIS — T14.8XXA MUSCLE STRAIN: ICD-10-CM

## 2024-07-11 DIAGNOSIS — M54.41 ACUTE RIGHT-SIDED LOW BACK PAIN WITH RIGHT-SIDED SCIATICA: Primary | ICD-10-CM

## 2024-07-11 DIAGNOSIS — M54.50 LOW BACK PAIN WITH RADIATION: ICD-10-CM

## 2024-07-11 PROCEDURE — 99213 OFFICE O/P EST LOW 20 MIN: CPT | Performed by: NURSE PRACTITIONER

## 2024-07-11 RX ORDER — PREDNISONE 10 MG/1
TABLET ORAL
Qty: 30 TABLET | Refills: 0 | Status: SHIPPED | OUTPATIENT
Start: 2024-07-11 | End: 2024-07-21

## 2024-07-11 RX ORDER — METHOCARBAMOL 500 MG/1
500 TABLET, FILM COATED ORAL 4 TIMES DAILY PRN
Qty: 40 TABLET | Refills: 0 | Status: SHIPPED | OUTPATIENT
Start: 2024-07-11 | End: 2024-09-09

## 2024-07-11 ASSESSMENT — PATIENT HEALTH QUESTIONNAIRE - PHQ9
SUM OF ALL RESPONSES TO PHQ9 QUESTIONS 1 AND 2: 0
SUM OF ALL RESPONSES TO PHQ9 QUESTIONS 1 AND 2: 0
2. FEELING DOWN, DEPRESSED OR HOPELESS: NOT AT ALL
1. LITTLE INTEREST OR PLEASURE IN DOING THINGS: NOT AT ALL
SUM OF ALL RESPONSES TO PHQ9 QUESTIONS 1 AND 2: 0
1. LITTLE INTEREST OR PLEASURE IN DOING THINGS: NOT AT ALL
1. LITTLE INTEREST OR PLEASURE IN DOING THINGS: NOT AT ALL

## 2024-07-11 ASSESSMENT — PAIN SCALES - GENERAL: PAINLEVEL: 7

## 2024-07-11 ASSESSMENT — ENCOUNTER SYMPTOMS
OCCASIONAL FEELINGS OF UNSTEADINESS: 0
DEPRESSION: 0
LOSS OF SENSATION IN FEET: 0

## 2024-07-11 NOTE — PROGRESS NOTES
Subjective   Patient ID: Pelon Blair is a 58 y.o. male who is with chief complaint of right sided lower back pain.    HPI  Patient is a 58 y.o. male who CONSULTED AT [facility] today. Patient is with complaint of right sided lower back pain. Patient states condition started about 5 days ago after carrying and spreading 50 sacks of mulch in his yard. Patient states the pain is on the right side of his lower back, achy in character, 5-9/10, wax and yelitza but always there, aggravated by movement, and radiating to the back of the thigh and back of the leg of the involved side. he denies fever, chills, paresthesia, paralysis, nor change in the color of the skin or nails of involved extremity. he states he tried OTC medications which afforded only slight relief of symptoms.    Review of Systems  General: no weight loss, generally healthy, no fatigue  Head:  no headaches / sinus pain, no vertigo, no injury  Eyes: no diplopia, no tearing, no pain,   Ears: no change in hearing, no tinnitus, no bleeding, no vertigo  Mouth:  no dental difficulties, no gingival bleeding, no sore throat, no loss of sense of taste  Nose: no congestion, no  discharge, no bleeding, no obstruction, no loss of sense of smell  Neck: no stiffness, no pain, no tenderness, no masses, no bruit  Pulmonary: no dyspnea, no wheezing, no hemoptysis, no cough  Cardiovascular: no chest pain, no palpitations, no syncope, no orthopnea  Gastrointestinal: no change in appetite, no dysphagia, no abdominal pains, no diarrhea, no emesis, no melena  Genito Urinary: no dysuria, no urinary urgency, no nocturia, no incontinence, no change in nature of urine  Musculoskeletal: (+) right sided lower back pain, no limitation of range of motion, no paresthesia, no numbness  Constitutional: no fever, no chills, no night sweats    Objective   Physical Exam  General: ambulatory, in no acute distress  Head: normocephalic, no lesions  Neck: supple, no  masses, no bruits  Musculoskeletal: no limitation of range of motion, no paralysis, no deformity; BacK: (+) direct tenderness on the right paravertebral muscle area level of L3 to L5, (+) straight  leg raise test on the right side, negative on the left side  Extremities: full and equal peripheral pulses, no edema, < 2 seconds capillary refill on all toes    Assessment/Plan   Problem List Items Addressed This Visit             ICD-10-CM    Back pain - Primary M54.9    Relevant Medications    predniSONE (Deltasone) 10 mg tablet    methocarbamol (Robaxin) 500 mg tablet     Other Visit Diagnoses         Codes    Low back pain with radiation     M54.50    Relevant Medications    predniSONE (Deltasone) 10 mg tablet    methocarbamol (Robaxin) 500 mg tablet    Muscle strain     T14.8XXA    Relevant Medications    predniSONE (Deltasone) 10 mg tablet    methocarbamol (Robaxin) 500 mg tablet        DISCHARGE SUMMARY:   Patient was seen and examined. Diagnosis, treatment, treatment options, and possible complications of today's illness discussed and explained to patient. Patient to take medication/s associated with this visit.  He may continue taking diclofenac as needed for pain. Patient may use OTC menthol patches as needed for comfort. Advised stretching and warm up exercises as tolerated prior to more intense physical exertion / exercise.  Advised to avoid heavy lifting, pulling, or pushing for at least a week. Reinforce stretching and exercises that strengthen core muscles.     Patient to come back in 4 - 7 days if needed for worsening symptoms. Patient verbalized understanding of plan of care.         JAMEY Cosme-CNP 07/11/24 1:30 PM

## 2024-07-11 NOTE — PATIENT INSTRUCTIONS
DISCHARGE SUMMARY:   Patient was seen and examined. Diagnosis, treatment, treatment options, and possible complications of today's illness discussed and explained to patient. Patient to take medication/s associated with this visit.  He may continue taking diclofenac as needed for pain. Patient may use OTC menthol patches as needed for comfort. Advised stretching and warm up exercises as tolerated prior to more intense physical exertion / exercise.  Advised to avoid heavy lifting, pulling, or pushing for at least a week. Reinforce stretching and exercises that strengthen core muscles.     Patient to come back in 4 - 7 days if needed for worsening symptoms. Patient verbalized understanding of plan of care.

## 2024-07-11 NOTE — PROGRESS NOTES
"Subjective   Patient ID: Pelon Blair is a 58 y.o. male who presents for Back Pain.      Symptoms: right side lower back pain, stiffness, muscle spasms that radiates down to the glutei  Length of symptoms: 3 days ago  OTC: none just a heating pad with mild help.  Related information:   HPI     Review of Systems    Objective   BP (!) 159/91 Comment: auto  Pulse 86   Temp 36.5 °C (97.7 °F)   Resp 16   Ht 1.88 m (6' 2\")   Wt 92.5 kg (204 lb)   SpO2 98%   BMI 26.19 kg/m²     Physical Exam    Assessment/Plan          "

## 2024-07-24 ENCOUNTER — NURSE ONLY (OUTPATIENT)
Dept: ENDOCRINOLOGY | Age: 59
End: 2024-07-24
Payer: COMMERCIAL

## 2024-07-24 DIAGNOSIS — E29.1 HYPOGONADISM MALE: Primary | ICD-10-CM

## 2024-07-24 PROCEDURE — 96372 THER/PROPH/DIAG INJ SC/IM: CPT | Performed by: INTERNAL MEDICINE

## 2024-07-24 RX ORDER — TESTOSTERONE CYPIONATE 200 MG/ML
200 INJECTION, SOLUTION INTRAMUSCULAR ONCE
Status: COMPLETED | OUTPATIENT
Start: 2024-07-24 | End: 2024-07-24

## 2024-07-24 RX ADMIN — TESTOSTERONE CYPIONATE 160 MG: 200 INJECTION, SOLUTION INTRAMUSCULAR at 13:52

## 2024-07-24 NOTE — PROGRESS NOTES
Patient given Testosterone 160 mg IM Left gluteal..  Will return in 2 weeks for next injection    Patient tolerated injection well.

## 2024-08-21 ENCOUNTER — NURSE ONLY (OUTPATIENT)
Dept: ENDOCRINOLOGY | Age: 59
End: 2024-08-21
Payer: COMMERCIAL

## 2024-08-21 DIAGNOSIS — E29.1 HYPOGONADISM MALE: Primary | ICD-10-CM

## 2024-08-21 PROCEDURE — 96372 THER/PROPH/DIAG INJ SC/IM: CPT | Performed by: INTERNAL MEDICINE

## 2024-08-21 RX ORDER — TESTOSTERONE CYPIONATE 200 MG/ML
200 INJECTION, SOLUTION INTRAMUSCULAR ONCE
Status: COMPLETED | OUTPATIENT
Start: 2024-08-21 | End: 2024-08-21

## 2024-08-21 RX ADMIN — TESTOSTERONE CYPIONATE 160 MG: 200 INJECTION, SOLUTION INTRAMUSCULAR at 13:25

## 2024-08-21 NOTE — PROGRESS NOTES
Patient given Testosterone 160mg IM right gluteal..  Will return in 2 weeks for next injection    Patient tolerated injection well.    Administrations This Visit       testosterone cypionate (DEPOTESTOTERONE CYPIONATE) injection 200 mg       Admin Date  08/21/2024 Action  Given Dose  160 mg Route  [employer] Documented By  Rocio House LPN

## 2024-11-20 NOTE — PROGRESS NOTES
"Subjective   Patient ID: Pelon Blair is a 59 y.o. male who presents for Annual Exam.    HPI entered by Brooklyn Chahal MA and reviewed by myself.   Present today for annual exam.   Does eat a generally healthy diet.   Does exercise.   Last eye exam was 2 years.   Last dental exam was June.   Is fast for blood work.     Patient accepts flu shot today    Patient presents today for a follow-up on Anxiety.  Is taking Lexapro.   States he has no concerns with this medication.   Rates the Anxiety 5/10 over the past 7 days.   Patient states it depends on the day.   Patient states if he is driving it gets worse  Reports that the medication gives 80% Anxiety control/relief.   Last took been a while.  Patient states ran out and had to call to get a appointment       The patient's allergies, medications, and past medical/surgical/family history were reviewed with them today and updated as indicated.    Review of Systems   Constitutional:  Negative for appetite change, chills, diaphoresis, fatigue, fever and unexpected weight change.   Respiratory:  Negative for cough, chest tightness, shortness of breath and wheezing.    Cardiovascular:  Negative for chest pain, palpitations and leg swelling.   Gastrointestinal:  Negative for abdominal pain, constipation and diarrhea.   Musculoskeletal:  Positive for back pain. Negative for arthralgias and myalgias.   Skin:  Negative for rash and wound.   Neurological:  Negative for dizziness, syncope, facial asymmetry and headaches.   Psychiatric/Behavioral:  Negative for confusion. The patient is nervous/anxious.       Objective   Vital Signs: /80   Pulse 64   Temp 36.7 °C (98.1 °F) (Temporal)   Resp 18   Ht 1.88 m (6' 2\")   Wt 94.8 kg (209 lb)   SpO2 99%   BMI 26.83 kg/m²     Physical Exam  Vitals and nursing note reviewed.   Constitutional:       General: He is not in acute distress.     Appearance: Normal appearance. He is not ill-appearing.   HENT:      Head: Normocephalic and " atraumatic.      Right Ear: External ear normal.      Left Ear: External ear normal.      Nose: No congestion or rhinorrhea.      Mouth/Throat:      Mouth: Mucous membranes are moist.      Pharynx: No oropharyngeal exudate or posterior oropharyngeal erythema.   Eyes:      General:         Right eye: No discharge.         Left eye: No discharge.      Extraocular Movements: Extraocular movements intact.      Conjunctiva/sclera: Conjunctivae normal.      Pupils: Pupils are equal, round, and reactive to light.   Cardiovascular:      Rate and Rhythm: Normal rate and regular rhythm.      Heart sounds: No murmur heard.  Pulmonary:      Effort: Pulmonary effort is normal. No respiratory distress.   Abdominal:      General: Bowel sounds are normal.      Palpations: Abdomen is soft.   Musculoskeletal:      Right lower leg: No edema.      Left lower leg: No edema.   Skin:     General: Skin is warm and dry.      Coloration: Skin is not jaundiced.      Findings: No erythema or rash.   Neurological:      General: No focal deficit present.      Mental Status: He is alert. Mental status is at baseline.   Psychiatric:         Mood and Affect: Mood normal.         Behavior: Behavior normal.         Thought Content: Thought content normal.     POCT today: No results found for this visit on 11/21/24.     Assessment & Plan  1. Routine general medical examination at a health care facility  Basic Metabolic Panel    CBC and Auto Differential      2. Hyperlipidemia, unspecified hyperlipidemia type  Lipid Panel    atorvastatin (Lipitor) 40 mg tablet      3. Back pain, unspecified back location, unspecified back pain laterality, unspecified chronicity  diclofenac (Voltaren) 75 mg EC tablet      4. Anxiety  escitalopram (Lexapro) 20 mg tablet      5. Need for immunization against influenza  Flu vaccine, trivalent, preservative free, age 6 months and greater (Fluarix/Fluzone/Flulaval)      6. Prostate cancer screening  Prostate Spec.Ag,Screen       7. Encounter for screening for malignant neoplasm of colon  Cologuard® colon cancer screening    Cologuard® colon cancer screening      Reviewed health maintenance. Discussed Zoster vax, which he can get at his local pharmacy. Verbalized understanding.    Reviewed treatment options and health maintenance. Take medications as prescribed. We will contact you with the results of any ordered testing; please send a [employer] message or call the office if you do not hear from us. Follow up in the office as discussed. Return sooner if symptoms do not resolve as expected.     Taty Qiu, APRN-CNP, DNP, CCRN  Family Nurse Practitioner  [facility]

## 2024-11-21 ENCOUNTER — OFFICE VISIT (OUTPATIENT)
Dept: PRIMARY CARE | Facility: CLINIC | Age: 59
End: 2024-11-21
Payer: COMMERCIAL

## 2024-11-21 VITALS
SYSTOLIC BLOOD PRESSURE: 140 MMHG | WEIGHT: 209 LBS | OXYGEN SATURATION: 99 % | HEIGHT: 74 IN | DIASTOLIC BLOOD PRESSURE: 80 MMHG | TEMPERATURE: 98.1 F | RESPIRATION RATE: 18 BRPM | HEART RATE: 64 BPM | BODY MASS INDEX: 26.82 KG/M2

## 2024-11-21 DIAGNOSIS — E78.5 HYPERLIPIDEMIA, UNSPECIFIED HYPERLIPIDEMIA TYPE: ICD-10-CM

## 2024-11-21 DIAGNOSIS — M54.9 BACK PAIN, UNSPECIFIED BACK LOCATION, UNSPECIFIED BACK PAIN LATERALITY, UNSPECIFIED CHRONICITY: ICD-10-CM

## 2024-11-21 DIAGNOSIS — F41.9 ANXIETY: ICD-10-CM

## 2024-11-21 DIAGNOSIS — Z12.11 ENCOUNTER FOR SCREENING FOR MALIGNANT NEOPLASM OF COLON: ICD-10-CM

## 2024-11-21 DIAGNOSIS — Z12.5 PROSTATE CANCER SCREENING: ICD-10-CM

## 2024-11-21 DIAGNOSIS — Z13.220 LIPID SCREENING: ICD-10-CM

## 2024-11-21 DIAGNOSIS — Z23 NEED FOR IMMUNIZATION AGAINST INFLUENZA: ICD-10-CM

## 2024-11-21 DIAGNOSIS — Z00.00 ROUTINE GENERAL MEDICAL EXAMINATION AT A HEALTH CARE FACILITY: Primary | ICD-10-CM

## 2024-11-21 PROCEDURE — 3008F BODY MASS INDEX DOCD: CPT | Performed by: NURSE PRACTITIONER

## 2024-11-21 PROCEDURE — 99396 PREV VISIT EST AGE 40-64: CPT | Performed by: NURSE PRACTITIONER

## 2024-11-21 PROCEDURE — 90656 IIV3 VACC NO PRSV 0.5 ML IM: CPT | Performed by: NURSE PRACTITIONER

## 2024-11-21 PROCEDURE — 90471 IMMUNIZATION ADMIN: CPT | Performed by: NURSE PRACTITIONER

## 2024-11-21 RX ORDER — ESCITALOPRAM OXALATE 20 MG/1
20 TABLET ORAL DAILY
Qty: 90 TABLET | Refills: 2 | Status: SHIPPED | OUTPATIENT
Start: 2024-11-21

## 2024-11-21 RX ORDER — ATORVASTATIN CALCIUM 40 MG/1
40 TABLET, FILM COATED ORAL NIGHTLY
Qty: 90 TABLET | Refills: 2 | Status: SHIPPED | OUTPATIENT
Start: 2024-11-21

## 2024-11-21 RX ORDER — DICLOFENAC SODIUM 75 MG/1
75 TABLET, DELAYED RELEASE ORAL 2 TIMES DAILY
Qty: 90 TABLET | Refills: 2 | Status: SHIPPED | OUTPATIENT
Start: 2024-11-21

## 2024-11-21 ASSESSMENT — PATIENT HEALTH QUESTIONNAIRE - PHQ9
2. FEELING DOWN, DEPRESSED OR HOPELESS: NOT AT ALL
1. LITTLE INTEREST OR PLEASURE IN DOING THINGS: NOT AT ALL
SUM OF ALL RESPONSES TO PHQ9 QUESTIONS 1 AND 2: 0

## 2024-11-21 NOTE — ASSESSMENT & PLAN NOTE
Orders:    diclofenac (Voltaren) 75 mg EC tablet; Take 1 tablet (75 mg) by mouth 2 times a day.

## 2024-11-21 NOTE — ASSESSMENT & PLAN NOTE
Orders:    atorvastatin (Lipitor) 40 mg tablet; Take 1 tablet (40 mg) by mouth once daily at bedtime.

## 2024-11-22 ENCOUNTER — LAB (OUTPATIENT)
Dept: LAB | Facility: LAB | Age: 59
End: 2024-11-22
Payer: COMMERCIAL

## 2024-11-22 DIAGNOSIS — Z00.00 ROUTINE GENERAL MEDICAL EXAMINATION AT A HEALTH CARE FACILITY: ICD-10-CM

## 2024-11-22 DIAGNOSIS — Z13.220 LIPID SCREENING: ICD-10-CM

## 2024-11-22 DIAGNOSIS — Z12.5 PROSTATE CANCER SCREENING: ICD-10-CM

## 2024-11-22 LAB
ANION GAP SERPL CALC-SCNC: 12 MMOL/L (ref 10–20)
BASOPHILS # BLD AUTO: 0.06 X10*3/UL (ref 0–0.1)
BASOPHILS NFR BLD AUTO: 1.1 %
BUN SERPL-MCNC: 14 MG/DL (ref 6–23)
CALCIUM SERPL-MCNC: 9.4 MG/DL (ref 8.6–10.3)
CHLORIDE SERPL-SCNC: 101 MMOL/L (ref 98–107)
CHOLEST SERPL-MCNC: 273 MG/DL (ref 0–199)
CHOLESTEROL/HDL RATIO: 3.2
CO2 SERPL-SCNC: 27 MMOL/L (ref 21–32)
CREAT SERPL-MCNC: 1.06 MG/DL (ref 0.5–1.3)
EGFRCR SERPLBLD CKD-EPI 2021: 81 ML/MIN/1.73M*2
EOSINOPHIL # BLD AUTO: 0.35 X10*3/UL (ref 0–0.7)
EOSINOPHIL NFR BLD AUTO: 6.4 %
ERYTHROCYTE [DISTWIDTH] IN BLOOD BY AUTOMATED COUNT: 12 % (ref 11.5–14.5)
GLUCOSE SERPL-MCNC: 99 MG/DL (ref 74–99)
HCT VFR BLD AUTO: 42.7 % (ref 41–52)
HDLC SERPL-MCNC: 84.5 MG/DL
HGB BLD-MCNC: 14.6 G/DL (ref 13.5–17.5)
IMM GRANULOCYTES # BLD AUTO: 0.01 X10*3/UL (ref 0–0.7)
IMM GRANULOCYTES NFR BLD AUTO: 0.2 % (ref 0–0.9)
LDLC SERPL CALC-MCNC: 157 MG/DL
LYMPHOCYTES # BLD AUTO: 1.42 X10*3/UL (ref 1.2–4.8)
LYMPHOCYTES NFR BLD AUTO: 25.8 %
MCH RBC QN AUTO: 31.8 PG (ref 26–34)
MCHC RBC AUTO-ENTMCNC: 34.2 G/DL (ref 32–36)
MCV RBC AUTO: 93 FL (ref 80–100)
MONOCYTES # BLD AUTO: 0.41 X10*3/UL (ref 0.1–1)
MONOCYTES NFR BLD AUTO: 7.5 %
NEUTROPHILS # BLD AUTO: 3.25 X10*3/UL (ref 1.2–7.7)
NEUTROPHILS NFR BLD AUTO: 59 %
NON HDL CHOLESTEROL: 189 MG/DL (ref 0–149)
NRBC BLD-RTO: 0 /100 WBCS (ref 0–0)
PLATELET # BLD AUTO: 171 X10*3/UL (ref 150–450)
POTASSIUM SERPL-SCNC: 4.6 MMOL/L (ref 3.5–5.3)
PSA SERPL-MCNC: 0.24 NG/ML
RBC # BLD AUTO: 4.59 X10*6/UL (ref 4.5–5.9)
SODIUM SERPL-SCNC: 135 MMOL/L (ref 136–145)
TRIGL SERPL-MCNC: 159 MG/DL (ref 0–149)
VLDL: 32 MG/DL (ref 0–40)
WBC # BLD AUTO: 5.5 X10*3/UL (ref 4.4–11.3)

## 2024-11-22 PROCEDURE — 85025 COMPLETE CBC W/AUTO DIFF WBC: CPT

## 2024-11-22 PROCEDURE — 80048 BASIC METABOLIC PNL TOTAL CA: CPT

## 2024-11-22 PROCEDURE — 36415 COLL VENOUS BLD VENIPUNCTURE: CPT

## 2024-11-22 PROCEDURE — 80061 LIPID PANEL: CPT

## 2024-11-22 PROCEDURE — 84153 ASSAY OF PSA TOTAL: CPT

## 2024-11-29 ENCOUNTER — TELEPHONE (OUTPATIENT)
Dept: PRIMARY CARE | Facility: CLINIC | Age: 59
End: 2024-11-29
Payer: COMMERCIAL

## 2024-11-29 NOTE — TELEPHONE ENCOUNTER
----- Message from Taty Qiu sent at 11/29/2024  8:50 AM EST -----  Please let Pelon Blair know recent results are within normal or expected range except for his cholesterol which was high.  Please ask him if he was taking his Lipitor at that time or if he had been out of it and let me know.      Thanks,  Taty

## 2024-12-02 ASSESSMENT — ENCOUNTER SYMPTOMS
FATIGUE: 0
MYALGIAS: 0
PALPITATIONS: 0
DIARRHEA: 0
NERVOUS/ANXIOUS: 1
SHORTNESS OF BREATH: 0
WOUND: 0
HEADACHES: 0
CONFUSION: 0
COUGH: 0
UNEXPECTED WEIGHT CHANGE: 0
DIAPHORESIS: 0
ABDOMINAL PAIN: 0
FEVER: 0
WHEEZING: 0
DIZZINESS: 0
APPETITE CHANGE: 0
CONSTIPATION: 0
FACIAL ASYMMETRY: 0
BACK PAIN: 1
CHILLS: 0
CHEST TIGHTNESS: 0
ARTHRALGIAS: 0

## 2024-12-03 NOTE — TELEPHONE ENCOUNTER
Pt wife, akilah per HIPAA, says he was out of his Lipitor at the time. It has been refilled on 11/21/24

## 2024-12-14 LAB — NONINV COLON CA DNA+OCC BLD SCRN STL QL: NORMAL

## 2024-12-17 ENCOUNTER — TELEPHONE (OUTPATIENT)
Dept: PRIMARY CARE | Facility: CLINIC | Age: 59
End: 2024-12-17
Payer: COMMERCIAL

## 2024-12-17 NOTE — TELEPHONE ENCOUNTER
----- Message from Taty Qiu sent at 12/17/2024  8:17 AM EST -----  Please let Pelon Blair know there was some sort of problem with his Cologuard sample and they could not run it in the lab.  They will be reaching out to him to repeat the test.    Thanks,  Taty

## 2025-01-14 ENCOUNTER — OFFICE VISIT (OUTPATIENT)
Dept: PRIMARY CARE | Facility: CLINIC | Age: 60
End: 2025-01-14
Payer: COMMERCIAL

## 2025-01-14 VITALS
DIASTOLIC BLOOD PRESSURE: 70 MMHG | WEIGHT: 200.8 LBS | SYSTOLIC BLOOD PRESSURE: 128 MMHG | RESPIRATION RATE: 16 BRPM | BODY MASS INDEX: 25.77 KG/M2 | OXYGEN SATURATION: 96 % | TEMPERATURE: 98.3 F | HEIGHT: 74 IN | HEART RATE: 83 BPM

## 2025-01-14 DIAGNOSIS — J00 NASOPHARYNGITIS: ICD-10-CM

## 2025-01-14 DIAGNOSIS — R09.81 NASAL CONGESTION WITH RHINORRHEA: ICD-10-CM

## 2025-01-14 DIAGNOSIS — R05.9 COUGH IN ADULT PATIENT: ICD-10-CM

## 2025-01-14 DIAGNOSIS — J34.89 NASAL CONGESTION WITH RHINORRHEA: ICD-10-CM

## 2025-01-14 DIAGNOSIS — J01.10 ACUTE FRONTAL SINUSITIS, RECURRENCE NOT SPECIFIED: Primary | ICD-10-CM

## 2025-01-14 PROCEDURE — 99213 OFFICE O/P EST LOW 20 MIN: CPT | Performed by: NURSE PRACTITIONER

## 2025-01-14 RX ORDER — AMOXICILLIN 875 MG/1
875 TABLET, FILM COATED ORAL 2 TIMES DAILY
Qty: 20 TABLET | Refills: 0 | Status: SHIPPED | OUTPATIENT
Start: 2025-01-14 | End: 2025-01-24

## 2025-01-14 ASSESSMENT — PATIENT HEALTH QUESTIONNAIRE - PHQ9
1. LITTLE INTEREST OR PLEASURE IN DOING THINGS: NOT AT ALL
SUM OF ALL RESPONSES TO PHQ9 QUESTIONS 1 AND 2: 0
2. FEELING DOWN, DEPRESSED OR HOPELESS: NOT AT ALL
1. LITTLE INTEREST OR PLEASURE IN DOING THINGS: NOT AT ALL
2. FEELING DOWN, DEPRESSED OR HOPELESS: NOT AT ALL
1. LITTLE INTEREST OR PLEASURE IN DOING THINGS: NOT AT ALL
2. FEELING DOWN, DEPRESSED OR HOPELESS: NOT AT ALL
SUM OF ALL RESPONSES TO PHQ9 QUESTIONS 1 AND 2: 0
SUM OF ALL RESPONSES TO PHQ9 QUESTIONS 1 AND 2: 0

## 2025-01-14 ASSESSMENT — ENCOUNTER SYMPTOMS
LOSS OF SENSATION IN FEET: 0
DEPRESSION: 0
OCCASIONAL FEELINGS OF UNSTEADINESS: 0

## 2025-01-14 ASSESSMENT — PAIN SCALES - GENERAL: PAINLEVEL_OUTOF10: 2

## 2025-01-14 NOTE — PROGRESS NOTES
"Subjective   Patient ID: Pelon Blair is a 59 y.o. male who presents for sinus       .Symptoms: sinus pressure, stuffy nose , congestion, headaches, nose bleeds,   Length of symptoms: 6 days ago  OTC: tylenol sinus  with mild help.  Related information:    HPI     Review of Systems    Objective   /70   Pulse 83   Temp 36.8 °C (98.3 °F)   Resp 16   Ht 1.88 m (6' 2\")   Wt 91.1 kg (200 lb 12.8 oz)   SpO2 96%   BMI 25.78 kg/m²     Physical Exam    Assessment/Plan          "

## 2025-01-14 NOTE — PATIENT INSTRUCTIONS
DISCHARGE SUMMARY:   Patient was seen and examined. Diagnosis, treatment, treatment options, and possible complications of today's illness discussed and explained to patient. Patient to take medication/s associated with this visit. Patient may take OTC decongestant of choice as needed. Patient may also take OTC analgesic/ antipyretic if needed for pain/fever. Advised to increase oral fluid intake. Advised steam inhalation if needed to relieve congestion. Advised warm saline gargle if needed to relieve throat discomfort. Advised Listerine antiseptic mouthwash gargle TID. Patient may use Cepacol oral spray as needed to relieve throat discomfort. Patient was advised to discard the old toothbrush and use a new toothbrush beginning on the third of antibiotics. Advised to come back if with worsening or persistent symptoms. Patient verbalized understanding of plan of care.    Patient to come back in 7 - 10 days if needed for worsening symptoms.

## 2025-01-14 NOTE — PROGRESS NOTES
Subjective   Patient ID: Pelon Blair is a 59 y.o. male who is with a chief complaint of symptoms of respiratory tract infection.     HPI   Patient is a 59 y.o. male who CONSULTED AT [facility] today. Patient is with complaints of nasal congestion, nasal discharge, headache, frontal sinus pain, mild post nasal drip, and mild cough. He denies having any sore throat, fatigue, muscle ache, loss of sense of taste, loss of sense of smell, diarrhea, chills nor fever. Patient states that present condition started about 7 days ago. Patient denies history of recent travel, exposure to person/people who tested positive for COVID 19, nor exposure to person/people with flu like symptoms. he denies shortness of breath, chest pain, palpitations, nor edema. he stated that he  tried OTC medications which afforded only slight relief of symptoms. he denies nausea, vomiting, abdominal pain, nor any other symptoms.    Patient states he had his COVID vaccine.  Patient states he had the flu shot for this season.    Review of Systems  General: no weight loss, generally healthy, no fatigue  Head: (+) headache, (+) frontal sinus pain, no vertigo, no injury  Eyes: no diplopia, no tearing, no pain,   Ears: no change in hearing, no tinnitus, no bleeding, no vertigo  Mouth:  no dental difficulties, no gingival bleeding, no sore throat, no loss of sense of taste, (+) mild post nasal drip,   Nose: (+) congestion, (+) discharge, no bleeding, no obstruction, no loss of sense of smell  Neck: no stiffness, no pain, no tenderness, no masses, no bruit  Pulmonary: no dyspnea, no wheezing, no hemoptysis, (+) mild cough  Cardiovascular: no chest pain, no palpitations, no syncope, no orthopnea  Gastrointestinal: no change in appetite, no dysphagia, no abdominal pains, no diarrhea, no emesis, no melena  Genito Urinary: no dysuria, no urinary urgency, no nocturia, no incontinence, no change in nature of urine  Musculoskeletal: no  "muscle ache, no joint pain, no limitation of range of motion, no paresthesia, no numbness  Constitutional: no fever, no chills, no night sweats    Objective   /70   Pulse 83   Temp 36.8 °C (98.3 °F)   Resp 16   Ht 1.88 m (6' 2\")   Wt 91.1 kg (200 lb 12.8 oz)   SpO2 96%   BMI 25.78 kg/m²     Physical Exam  General: ambulatory, in no acute distress  Head: normocephalic, no lesions, (+) frontal sinus tenderness  Eyes: pink palpebral conjunctiva, anicteric sclerae, PERRLA, EOM's full  Ears: clear external auditory canals, no ear discharge, no bleeding from the ears, tympanic membrane intact  Nose: (+) congested nasal mucosa, (+) yellow mucoid nasal discharge, no bleeding, no obstruction  Throat: (+) erythema, and (+) exudate on posterior pharyngeal wall, no lesion  Neck: supple, no masses, no bruits, no CLADP  Chest: symmetrical chest expansion, no lagging, no retractions, clear breath sounds, no rales, no wheezes    Assessment/Plan   Problem List Items Addressed This Visit    None  Visit Diagnoses         Codes    Acute frontal sinusitis, recurrence not specified    -  Primary J01.10    Relevant Medications    amoxicillin (Amoxil) 875 mg tablet    Nasal congestion with rhinorrhea     R09.81, J34.89    Relevant Medications    amoxicillin (Amoxil) 875 mg tablet    Nasopharyngitis     J00    Relevant Medications    amoxicillin (Amoxil) 875 mg tablet    Cough in adult patient     R05.9    Relevant Medications    amoxicillin (Amoxil) 875 mg tablet        DISCHARGE SUMMARY:   Patient was seen and examined. Diagnosis, treatment, treatment options, and possible complications of today's illness discussed and explained to patient. Patient to take medication/s associated with this visit. Patient may take OTC decongestant of choice as needed. Patient may also take OTC analgesic/ antipyretic if needed for pain/fever. Advised to increase oral fluid intake. Advised steam inhalation if needed to relieve congestion. " Advised warm saline gargle if needed to relieve throat discomfort. Advised Listerine antiseptic mouthwash gargle TID. Patient may use Cepacol oral spray as needed to relieve throat discomfort. Patient was advised to discard the old toothbrush and use a new toothbrush beginning on the third of antibiotics. Advised to come back if with worsening or persistent symptoms. Patient verbalized understanding of plan of care.    Patient to come back in 7 - 10 days if needed for worsening symptoms.

## 2025-02-19 LAB — NONINV COLON CA DNA+OCC BLD SCRN STL QL: NEGATIVE

## 2025-03-05 ENCOUNTER — TELEPHONE (OUTPATIENT)
Dept: PRIMARY CARE | Facility: CLINIC | Age: 60
End: 2025-03-05

## 2025-03-05 ENCOUNTER — OFFICE VISIT (OUTPATIENT)
Dept: PRIMARY CARE | Facility: CLINIC | Age: 60
End: 2025-03-05
Payer: COMMERCIAL

## 2025-03-05 VITALS
DIASTOLIC BLOOD PRESSURE: 80 MMHG | RESPIRATION RATE: 18 BRPM | OXYGEN SATURATION: 99 % | TEMPERATURE: 98.6 F | HEIGHT: 74 IN | HEART RATE: 73 BPM | WEIGHT: 208.8 LBS | SYSTOLIC BLOOD PRESSURE: 150 MMHG | BODY MASS INDEX: 26.8 KG/M2

## 2025-03-05 DIAGNOSIS — M54.2 NECK PAIN, ACUTE: Primary | ICD-10-CM

## 2025-03-05 DIAGNOSIS — T14.8XXA MUSCLE STRAIN: ICD-10-CM

## 2025-03-05 DIAGNOSIS — M54.41 ACUTE RIGHT-SIDED LOW BACK PAIN WITH RIGHT-SIDED SCIATICA: ICD-10-CM

## 2025-03-05 DIAGNOSIS — M54.50 LOW BACK PAIN WITH RADIATION: ICD-10-CM

## 2025-03-05 PROCEDURE — 99213 OFFICE O/P EST LOW 20 MIN: CPT | Performed by: FAMILY MEDICINE

## 2025-03-05 PROCEDURE — 1036F TOBACCO NON-USER: CPT | Performed by: FAMILY MEDICINE

## 2025-03-05 PROCEDURE — 3008F BODY MASS INDEX DOCD: CPT | Performed by: FAMILY MEDICINE

## 2025-03-05 RX ORDER — PREDNISONE 10 MG/1
TABLET ORAL
Qty: 51 TABLET | Refills: 0 | Status: SHIPPED | OUTPATIENT
Start: 2025-03-05

## 2025-03-05 RX ORDER — OXYCODONE AND ACETAMINOPHEN 5; 325 MG/1; MG/1
1 TABLET ORAL EVERY 8 HOURS PRN
Qty: 21 TABLET | Refills: 0 | Status: SHIPPED | OUTPATIENT
Start: 2025-03-05 | End: 2025-03-12

## 2025-03-05 RX ORDER — METHOCARBAMOL 500 MG/1
500 TABLET, FILM COATED ORAL 4 TIMES DAILY PRN
Qty: 40 TABLET | Refills: 0 | Status: SHIPPED | OUTPATIENT
Start: 2025-03-05 | End: 2025-05-04

## 2025-03-05 ASSESSMENT — PATIENT HEALTH QUESTIONNAIRE - PHQ9
2. FEELING DOWN, DEPRESSED OR HOPELESS: NOT AT ALL
SUM OF ALL RESPONSES TO PHQ9 QUESTIONS 1 AND 2: 0
1. LITTLE INTEREST OR PLEASURE IN DOING THINGS: NOT AT ALL

## 2025-03-05 NOTE — LETTER
March 5, 2025     Patient: Pelon Blair   YOB: 1965   Date of Visit: 3/5/2025       To Whom It May Concern:    Pelon Blair was seen in my clinic on 3/5/2025  . Please excuse Pelon for his absence from work on 3/4/2025, 3/5/2025, 3/6/2025, 3/7/2025, 3/8/2025 and 3/9/2025. He can return to work without restrictions 3/10/2025.    If you have any questions or concerns, please don't hesitate to call.         Sincerely,         Shailesh De Oliveira MD

## 2025-03-05 NOTE — TELEPHONE ENCOUNTER
Letter created to excuse him from 3/4/25, 3/5/25. 3/6/25 3/7/25 3/8/25, 3/9/25 and rtw on 3/10/25 w/o restrictions

## 2025-03-05 NOTE — PROGRESS NOTES
Subjective   Patient ID: Pelon Blair is a 59 y.o. male who presents for Back Pain and Neck Pain.  HPI  Patient presents in the office today with complaints of back pain. Patient states he was doing yard work on Monday. His sciatica started to bothering him. Describes the pain as a shooting pain. Rates pain 5/10 ongoing 2 days. Hasn't tried anything.    Patient states on right side he is having neck pain. He states can't move his head and the pain is going down into his right arm. Patient states the neck pain is a throbbing pain. Rates pain 7/10 Ongoing X 1 days. Hasn't tried     Review of Systems  Constitutional:  no chills, no fever and no night sweats.  Eyes: no blurred vision and no eyesight problems.  ENT: no hearing loss, no nasal congestion, no hoarseness and no sore throat.  Neck: no mass (es) and no swelling.  Cardiovascular: no chest pain, no intermittent leg claudication, no lower extremity edema, no palpitation and no syncope.  Respiratory: no cough, no shortness of breath during exertion, no shortness of breath at rest and no wheezing.  Gastrointestinal: no abdominal pain, no blood in stools, no constipation, no diarrhea, no melena, no nausea, no rectal pain and no vomiting.  Genitourinary: no dysuria, no change in urinary frequency, no urinary hesitancy and no feelings of urinary urgency.  Musculoskeletal: no arthralgias, no back pain and no myalgias.  Integumentary: no new skin lesions and no rashes.  Neurological: no difficulty walking, no headache, no limb weakness, no numbness and no tingling.  Psychiatric/Behavioral: no anxiety, no depression, no anhedonia and no substance use disorders.  Endocrine: no recent weight gain and no recent weight loss.  Hematologic/Lymphatic: no tendency for easy bruising and no swollen glands    Objective   Physical Exam  Patient with complaints of exacerbation of chronic right low back pain with sciatica now with severe neck pain on the right side extremely painful  "cannot move his neck was started with a little bit before he went to bed last night woke up in the [facility] with mixed throbbing pain difficulty driving here today he works [place] a  [place]  Did not go to work yesterday or today.  Physical exam he has muscle spasms in the right paracervical muscles and into the trapezius.  Pain radiating into the right shoulder deltoid area poor exam secondary to the spasming and the extreme pain with motion.  Also pain in the right SI joint right sciatic notch and right buttock with radiation into left hip to the right anterior thigh.  Lungs clear cardiac exam benign  /80   Pulse 73   Temp 37 °C (98.6 °F) (Temporal)   Resp 18   Ht 1.88 m (6' 2\")   Wt 94.7 kg (208 lb 12.8 oz)   SpO2 99%   BMI 26.81 kg/m²     Lab Results   Component Value Date    WBC 5.5 11/22/2024    HGB 14.6 11/22/2024    HCT 42.7 11/22/2024    MCV 93 11/22/2024     11/22/2024       Assessment/Plan assessments acute muscle spasm pain sciatica plan burst and taper prednisone refill his Robaxin and to give him pain medication he will use heat and gentle stretches as tolerated he will follow-up with us on Monday when we have we will hopefully let him go back to work recommendation is for him to stay home until then if he is still not better on Monday he should not going to work but call us  Problem List Items Addressed This Visit    None    "

## 2025-07-03 ENCOUNTER — OFFICE VISIT (OUTPATIENT)
Dept: PRIMARY CARE | Facility: CLINIC | Age: 60
End: 2025-07-03
Payer: COMMERCIAL

## 2025-07-03 VITALS
OXYGEN SATURATION: 97 % | TEMPERATURE: 98.3 F | BODY MASS INDEX: 26.39 KG/M2 | WEIGHT: 205.6 LBS | DIASTOLIC BLOOD PRESSURE: 77 MMHG | HEART RATE: 74 BPM | HEIGHT: 74 IN | RESPIRATION RATE: 16 BRPM | SYSTOLIC BLOOD PRESSURE: 138 MMHG

## 2025-07-03 DIAGNOSIS — M76.62 ACHILLES TENDINITIS OF LEFT LOWER EXTREMITY: Primary | ICD-10-CM

## 2025-07-03 DIAGNOSIS — M76.60 ACHILLES TENDON PAIN: ICD-10-CM

## 2025-07-03 PROCEDURE — 99212 OFFICE O/P EST SF 10 MIN: CPT | Performed by: NURSE PRACTITIONER

## 2025-07-03 RX ORDER — PREDNISONE 10 MG/1
TABLET ORAL
Qty: 30 TABLET | Refills: 0 | Status: SHIPPED | OUTPATIENT
Start: 2025-07-03 | End: 2025-07-13

## 2025-07-03 RX ORDER — DICLOFENAC SODIUM 75 MG/1
75 TABLET, DELAYED RELEASE ORAL 2 TIMES DAILY PRN
Qty: 60 TABLET | Refills: 0 | Status: SHIPPED | OUTPATIENT
Start: 2025-07-03 | End: 2025-09-01

## 2025-07-03 ASSESSMENT — PATIENT HEALTH QUESTIONNAIRE - PHQ9
2. FEELING DOWN, DEPRESSED OR HOPELESS: NOT AT ALL
SUM OF ALL RESPONSES TO PHQ9 QUESTIONS 1 AND 2: 0
SUM OF ALL RESPONSES TO PHQ9 QUESTIONS 1 AND 2: 0
1. LITTLE INTEREST OR PLEASURE IN DOING THINGS: NOT AT ALL
2. FEELING DOWN, DEPRESSED OR HOPELESS: NOT AT ALL
1. LITTLE INTEREST OR PLEASURE IN DOING THINGS: NOT AT ALL

## 2025-07-03 ASSESSMENT — ENCOUNTER SYMPTOMS
LOSS OF SENSATION IN FEET: 0
DEPRESSION: 0
OCCASIONAL FEELINGS OF UNSTEADINESS: 0

## 2025-07-03 ASSESSMENT — PAIN SCALES - GENERAL: PAINLEVEL_OUTOF10: 0-NO PAIN

## 2025-07-03 NOTE — PROGRESS NOTES
Subjective   Patient ID: Pelon Blair is a 59 y.o. male is with chief complaint of pain and tenderness on the heel of the left foot.    HPI   Patient is a 59 y.o. male who CONSULTED AT [facility] today. Patient is with complaints of pain and tenderness on the heel of the left foot (Achilles tendon area). Patient states condition started about 3 weeks ago. Patient states the pain is on the achilles tendon area of his left foot, achy in character, 7/10 when he walks, intermittent, aggravated by movement, and non-radiating. He states that he does not recall any injury nor any particular precipitating event. Although, his job requires him to do a lot of walking including walking up and down ramps. he denies fever, chills, paresthesia, paralysis, nor change in the color of the skin or nails of involved extremity. he states he tried OTC medications which afforded only slight relief of symptoms.    Review of Systems  General: no weight loss, generally healthy, no fatigue  Head:  no headaches / sinus pain, no vertigo, no injury  Eyes: no diplopia, no tearing, no pain,   Ears: no change in hearing, no tinnitus, no bleeding, no vertigo  Mouth:  no dental difficulties, no gingival bleeding, no sore throat, no loss of sense of taste  Nose: no congestion, no  discharge, no bleeding, no obstruction, no loss of sense of smell  Neck: no stiffness, no pain, no tenderness, no masses, no bruit  Pulmonary: no dyspnea, no wheezing, no hemoptysis, no cough  Cardiovascular: no chest pain, no palpitations, no syncope, no orthopnea  Gastrointestinal: no change in appetite, no dysphagia, no abdominal pains, no diarrhea, no emesis, no melena  Genito Urinary: no dysuria, no urinary urgency, no nocturia, no incontinence, no change in nature of urine  Musculoskeletal: (+) pain and tenderness on the left achilles tendon area, no limitation of range of motion, no paresthesia, no numbness  Constitutional: no fever, no  "chills, no night sweats    Objective   /77   Pulse 74   Temp 36.8 °C (98.3 °F)   Resp 16   Ht 1.88 m (6' 2\")   Wt 93.3 kg (205 lb 9.6 oz)   SpO2 97%   BMI 26.40 kg/m²     Physical Exam  General: ambulatory, in no acute distress  Musculoskeletal: (+) tenderness on the area of the Achilles tendon left side, negative Homans, no limitation of range of motion, no paralysis, no deformity  Extremities: full and equal peripheral pulses, no edema, Capillary refill is < 2 seconds on all toes.    Assessment/Plan   Problem List Items Addressed This Visit    None  Visit Diagnoses         Codes      Achilles tendinitis of left lower extremity    -  Primary M76.62    Relevant Medications    predniSONE (Deltasone) 10 mg tablet    diclofenac (Voltaren) 75 mg EC tablet      Achilles tendon pain     M76.60    Relevant Medications    predniSONE (Deltasone) 10 mg tablet    diclofenac (Voltaren) 75 mg EC tablet        DISCHARGE SUMMARY:   Patient was seen and examined. Diagnosis, treatment, treatment options, and possible complications of today's illness discussed and explained to patient. Patient to take medication/s associated with this visit. Components of RICE therapy explained to patient. Advised to come back if with worsening or persistent symptoms. Patient verbalized understanding of plan of care.    Patient to come back in 7 - 10 days if needed for worsening symptoms.       "

## 2025-07-03 NOTE — PROGRESS NOTES
"Subjective   Patient ID: Pelon Blair is a 59 y.o. male who presents for foot pain.        Symptoms: left foot pain achillis pain, aggravated by walking, extending. Pain is 7/10  Length of symptoms:  3 weeks ago  OTC: ibuprofen with no help.  Related information:    HPI     Review of Systems    Objective   /77   Pulse 74   Temp 36.8 °C (98.3 °F)   Resp 16   Ht 1.88 m (6' 2\")   Wt 93.3 kg (205 lb 9.6 oz)   SpO2 97%   BMI 26.40 kg/m²     Physical Exam    Assessment/Plan          "

## 2025-07-03 NOTE — PATIENT INSTRUCTIONS
DISCHARGE SUMMARY:   Patient was seen and examined. Diagnosis, treatment, treatment options, and possible complications of today's illness discussed and explained to patient. Patient to take medication/s associated with this visit. Components of RICE therapy explained to patient. Advised to come back if with worsening or persistent symptoms. Patient verbalized understanding of plan of care.    Patient to come back in 7 - 10 days if needed for worsening symptoms.